# Patient Record
Sex: FEMALE | Race: WHITE | NOT HISPANIC OR LATINO | Employment: OTHER | URBAN - METROPOLITAN AREA
[De-identification: names, ages, dates, MRNs, and addresses within clinical notes are randomized per-mention and may not be internally consistent; named-entity substitution may affect disease eponyms.]

---

## 2023-02-09 PROBLEM — S82.839A FRACTURE OF DISTAL FIBULA: Status: ACTIVE | Noted: 2023-02-09

## 2023-02-09 PROBLEM — S99.919A ANKLE INJURY, INITIAL ENCOUNTER: Status: ACTIVE | Noted: 2023-02-09

## 2023-02-09 PROBLEM — E55.9 VITAMIN D DEFICIENCY: Status: ACTIVE | Noted: 2023-02-09

## 2023-02-09 PROBLEM — M16.0 OSTEOARTHRITIS OF BOTH HIPS: Status: ACTIVE | Noted: 2023-02-09

## 2023-02-09 PROBLEM — H26.9 CATARACT, BILATERAL: Status: ACTIVE | Noted: 2023-02-09

## 2023-02-09 PROBLEM — M85.851 OSTEOPENIA OF RIGHT HIP: Status: ACTIVE | Noted: 2023-02-09

## 2023-02-09 PROBLEM — J30.9 ALLERGIC RHINITIS: Status: ACTIVE | Noted: 2023-02-09

## 2023-02-09 PROBLEM — E78.1 HYPERTRIGLYCERIDEMIA: Status: ACTIVE | Noted: 2023-02-09

## 2023-02-09 PROBLEM — L23.9 ALLERGIC DERMATITIS: Status: ACTIVE | Noted: 2023-02-09

## 2023-02-09 PROBLEM — H81.10 BPPV (BENIGN PAROXYSMAL POSITIONAL VERTIGO): Status: ACTIVE | Noted: 2023-02-09

## 2023-02-09 PROBLEM — R92.8 ABNORMAL MAMMOGRAM: Status: ACTIVE | Noted: 2023-02-09

## 2023-02-09 PROBLEM — H11.001 PTERYGIUM OF RIGHT EYE: Status: ACTIVE | Noted: 2023-02-09

## 2023-02-09 PROBLEM — T78.2XXA IDIOPATHIC ANAPHYLAXIS: Status: ACTIVE | Noted: 2023-02-09

## 2023-02-09 PROBLEM — S82.831A CLOSED FRACTURE OF RIGHT DISTAL FIBULA: Status: ACTIVE | Noted: 2023-02-09

## 2023-02-09 RX ORDER — ACETAMINOPHEN 500 MG
TABLET ORAL
COMMUNITY

## 2023-02-09 RX ORDER — ERGOCALCIFEROL 1.25 MG/1
1 CAPSULE ORAL
COMMUNITY
Start: 2018-11-01 | End: 2023-04-04 | Stop reason: SDUPTHER

## 2023-02-09 RX ORDER — EPINEPHRINE 0.3 MG/.3ML
0.3 INJECTION SUBCUTANEOUS AS NEEDED
COMMUNITY
Start: 2019-02-12 | End: 2023-04-04 | Stop reason: SDUPTHER

## 2023-02-09 RX ORDER — CALCIUM CARBONATE 600 MG
1 TABLET ORAL 3 TIMES DAILY
COMMUNITY
Start: 2021-03-15

## 2023-04-04 ENCOUNTER — OFFICE VISIT (OUTPATIENT)
Dept: PRIMARY CARE | Facility: CLINIC | Age: 73
End: 2023-04-04
Payer: COMMERCIAL

## 2023-04-04 VITALS
BODY MASS INDEX: 31.47 KG/M2 | DIASTOLIC BLOOD PRESSURE: 78 MMHG | RESPIRATION RATE: 16 BRPM | SYSTOLIC BLOOD PRESSURE: 124 MMHG | HEIGHT: 62 IN | TEMPERATURE: 97.4 F | HEART RATE: 89 BPM | WEIGHT: 171 LBS

## 2023-04-04 DIAGNOSIS — K22.0 SPASM OF THROAT MUSCLE: Primary | ICD-10-CM

## 2023-04-04 DIAGNOSIS — T78.2XXD IDIOPATHIC ANAPHYLAXIS, SUBSEQUENT ENCOUNTER: ICD-10-CM

## 2023-04-04 DIAGNOSIS — E66.9 OBESITY (BMI 30.0-34.9): ICD-10-CM

## 2023-04-04 DIAGNOSIS — E55.9 VITAMIN D DEFICIENCY: ICD-10-CM

## 2023-04-04 PROBLEM — J30.9 ALLERGIC RHINITIS: Status: RESOLVED | Noted: 2023-02-09 | Resolved: 2023-04-04

## 2023-04-04 PROBLEM — S82.831A CLOSED FRACTURE OF RIGHT DISTAL FIBULA: Status: RESOLVED | Noted: 2023-02-09 | Resolved: 2023-04-04

## 2023-04-04 PROBLEM — E66.811 OBESITY (BMI 30.0-34.9): Status: ACTIVE | Noted: 2023-04-04

## 2023-04-04 PROBLEM — S99.919A ANKLE INJURY, INITIAL ENCOUNTER: Status: RESOLVED | Noted: 2023-02-09 | Resolved: 2023-04-04

## 2023-04-04 PROBLEM — S82.839A FRACTURE OF DISTAL FIBULA: Status: RESOLVED | Noted: 2023-02-09 | Resolved: 2023-04-04

## 2023-04-04 PROBLEM — H11.001 PTERYGIUM OF RIGHT EYE: Status: RESOLVED | Noted: 2023-02-09 | Resolved: 2023-04-04

## 2023-04-04 PROCEDURE — 99214 OFFICE O/P EST MOD 30 MIN: CPT | Performed by: FAMILY MEDICINE

## 2023-04-04 PROCEDURE — 1036F TOBACCO NON-USER: CPT | Performed by: FAMILY MEDICINE

## 2023-04-04 PROCEDURE — 1160F RVW MEDS BY RX/DR IN RCRD: CPT | Performed by: FAMILY MEDICINE

## 2023-04-04 PROCEDURE — 1159F MED LIST DOCD IN RCRD: CPT | Performed by: FAMILY MEDICINE

## 2023-04-04 RX ORDER — EPINEPHRINE 0.3 MG/.3ML
0.3 INJECTION SUBCUTANEOUS AS NEEDED
Qty: 3 EACH | Refills: 3 | Status: SHIPPED | OUTPATIENT
Start: 2023-04-04

## 2023-04-04 RX ORDER — ERGOCALCIFEROL 1.25 MG/1
1 CAPSULE ORAL
Qty: 12 CAPSULE | Refills: 3 | Status: SHIPPED | OUTPATIENT
Start: 2023-04-04 | End: 2024-06-06

## 2023-04-04 RX ORDER — CALCIUM CARBONATE 200(500)MG
2 TABLET,CHEWABLE ORAL DAILY
COMMUNITY

## 2023-04-04 ASSESSMENT — PATIENT HEALTH QUESTIONNAIRE - PHQ9
SUM OF ALL RESPONSES TO PHQ9 QUESTIONS 1 AND 2: 0
2. FEELING DOWN, DEPRESSED OR HOPELESS: NOT AT ALL
1. LITTLE INTEREST OR PLEASURE IN DOING THINGS: NOT AT ALL

## 2023-04-04 NOTE — PROGRESS NOTES
Jodie Barton is a 72 y.o. female here today for vitamin d deficiency.     Vitamin D deficiency -- Patient reports no muscle weakness or pain.  Taking Vitamin D as instructed.  She takes 50,000 units once weekly.    Muscle spasm sensation in throat.  Gets pain in jaw on left.  Started in October since she had a dental scaling and 2 crowns.  Was dxed by dentist with TMJ.  Still with feelings of throat spasms at times.  Sxs have improved but not resolving.  She would like to see an ENT about this.      She has had trouble losing weight and is interested in my opinion about medications for weight loss.  She has never seen a dietician.          Objective    Visit Vitals  /78   Pulse 89   Temp 36.3 °C (97.4 °F)   Resp 16     Body mass index is 31.28 kg/m².     Physical Exam   General - Not in acute distress and cooperative.  Build & Nutrition - Well developed  Posture - Normal  Gait - Normal  Mental Status - alert and oriented x 3    Head - Normocephalic    Eyes - Bilateral - Sclera clear and lids pink without edema or mass.      Skin - Warm and dry with no rashes on visible skin    Neuropsychiatric - normal mood and affect, well groomed and good eye contact.  Able to articulate well with normal speech/language, rate and coherence.  Associations are intact.  No evidence of hallucinations, delusions, obsessions or homicidal/suicidal ideation.  Attention span and ability to concentrate are normal.        Assessment    1. Spasm of throat muscle  Referral to ENT   I am not certain what is causing the sensation but I will refer her to an ENT for further evaluation at her request.     2. Idiopathic anaphylaxis, subsequent encounter  EPINEPHrine 0.3 mg/0.3 mL injection syringe   I will refill her EpiPen at her request.     3. Vitamin D deficiency  ergocalciferol (Vitamin D-2) 1.25 MG (63803 UT) capsule   Condition well controlled.  No change in current treatment regimen.  Refill given of current medication.  Appropriate  labs ordered or reviewed.  Make a follow up appointment with me for recheck in 6 months.     4. Obesity (BMI 30.0-34.9)  Referral to Nutrition Services   I told her that I think she should see a dietitian to discuss her diet and give her counseling and advice on weight loss.  Her exercise seems excellent by her description.

## 2023-09-19 ENCOUNTER — TELEPHONE (OUTPATIENT)
Dept: PRIMARY CARE | Facility: CLINIC | Age: 73
End: 2023-09-19
Payer: COMMERCIAL

## 2023-09-19 NOTE — TELEPHONE ENCOUNTER
Pt wants to confirm she is up to date with vaccines, she will be getting her flu and rsv tomorrow.

## 2023-10-19 ENCOUNTER — OFFICE VISIT (OUTPATIENT)
Dept: PRIMARY CARE | Facility: CLINIC | Age: 73
End: 2023-10-19
Payer: COMMERCIAL

## 2023-10-19 VITALS
RESPIRATION RATE: 16 BRPM | TEMPERATURE: 97.3 F | HEART RATE: 104 BPM | SYSTOLIC BLOOD PRESSURE: 120 MMHG | BODY MASS INDEX: 29.44 KG/M2 | WEIGHT: 160 LBS | DIASTOLIC BLOOD PRESSURE: 80 MMHG | HEIGHT: 62 IN

## 2023-10-19 DIAGNOSIS — Z11.4 SCREENING FOR HIV WITHOUT PRESENCE OF RISK FACTORS: ICD-10-CM

## 2023-10-19 DIAGNOSIS — E78.1 HYPERTRIGLYCERIDEMIA: ICD-10-CM

## 2023-10-19 DIAGNOSIS — Z78.0 ASYMPTOMATIC MENOPAUSAL STATE: ICD-10-CM

## 2023-10-19 DIAGNOSIS — Z13.6 SCREENING FOR CARDIOVASCULAR CONDITION: ICD-10-CM

## 2023-10-19 DIAGNOSIS — Z00.00 ROUTINE GENERAL MEDICAL EXAMINATION AT HEALTH CARE FACILITY: Primary | ICD-10-CM

## 2023-10-19 DIAGNOSIS — Z00.00 ENCOUNTER FOR ANNUAL WELLNESS EXAM IN MEDICARE PATIENT: ICD-10-CM

## 2023-10-19 DIAGNOSIS — Z11.59 NEED FOR HEPATITIS C SCREENING TEST: ICD-10-CM

## 2023-10-19 PROCEDURE — G0439 PPPS, SUBSEQ VISIT: HCPCS | Performed by: FAMILY MEDICINE

## 2023-10-19 PROCEDURE — 1159F MED LIST DOCD IN RCRD: CPT | Performed by: FAMILY MEDICINE

## 2023-10-19 PROCEDURE — G0444 DEPRESSION SCREEN ANNUAL: HCPCS | Performed by: FAMILY MEDICINE

## 2023-10-19 PROCEDURE — 1036F TOBACCO NON-USER: CPT | Performed by: FAMILY MEDICINE

## 2023-10-19 PROCEDURE — 99397 PER PM REEVAL EST PAT 65+ YR: CPT | Performed by: FAMILY MEDICINE

## 2023-10-19 PROCEDURE — 1160F RVW MEDS BY RX/DR IN RCRD: CPT | Performed by: FAMILY MEDICINE

## 2023-10-19 PROCEDURE — 1170F FXNL STATUS ASSESSED: CPT | Performed by: FAMILY MEDICINE

## 2023-10-19 RX ORDER — TRIAMCINOLONE ACETONIDE 1 MG/G
PASTE DENTAL
COMMUNITY
Start: 2023-09-19 | End: 2023-11-22 | Stop reason: ALTCHOICE

## 2023-10-19 ASSESSMENT — ACTIVITIES OF DAILY LIVING (ADL)
TAKING_MEDICATION: INDEPENDENT
BATHING: INDEPENDENT
GROCERY_SHOPPING: INDEPENDENT
DRESSING: INDEPENDENT
MANAGING_FINANCES: INDEPENDENT
DOING_HOUSEWORK: INDEPENDENT

## 2023-10-19 NOTE — PROGRESS NOTES
History Of Present Illness  Ro Barton is a 73 y.o. male presenting for a Medicare Annual Wellness Exam.  Patient is here for a periodic health exam.  I reviewed previous preventative health measures including screening tests, immunizations and labs.  I reviewed screenings administered by my staff today.    Now on independent pharmacy compounded semaglutide injectable.  Goes to a [facility].  Has lost about 15 lbs. labs done prior to starting this medication showed 6.1 A1C - prediabetes.      Other labs all normal - see scanned report.  No FLP checked.         PREVIOUS PREVENTATIVE HEALTH  Colonoscopy : Yes    Date: 3/29/2019  Cologuard : No  Mammogram : Yes    Date: 1/30/2023  Bone Density : Yes    Date: 1/26/2021  Hepatitis C Antibody : No  Prevnar : Yes    Date: 3/23/2017 Prevnar 13 and 10/31/2019 Pneumovax 23  Shingrix : Yes    Date:  2019 x 2  Tdap within 10 years : Yes  Yearly Flu Shot : Yes    CURRENT FINDINGS  Falls : No  Problems with ADL's :  No  Healthy Diet : Yes  Exercise :  Yes  Vision or Hearing Problems : No  Depression Issues : No  Alcohol Use : No  Tobacco Use : No         Past Medical History  Patient Active Problem List   Diagnosis    Abnormal mammogram    Allergic dermatitis    BPPV (benign paroxysmal positional vertigo)    Cataract, bilateral    Hypertriglyceridemia    Idiopathic anaphylaxis    Osteoarthritis of both hips    Osteopenia of right hip    Vitamin D deficiency    Spasm of throat muscle    Obesity (BMI 30.0-34.9)       Past Surgical History:   Procedure Laterality Date    OTHER SURGICAL HISTORY  10/28/2019    Ankle fracture repair    TUBAL LIGATION  1986    WISDOM TOOTH EXTRACTION  1971        Current Outpatient Medications:     acetaminophen (Tylenol) 500 mg tablet, Take by mouth., Disp: , Rfl:     calcium carbonate (Tums) 200 mg calcium chewable tablet, Chew 2 tablets (1,000 mg) once daily., Disp: , Rfl:     calcium carbonate 600 mg calcium (1,500 mg) tablet, Take 1 tablet  (600 mg) by mouth 3 times a day., Disp: , Rfl:     EPINEPHrine 0.3 mg/0.3 mL injection syringe, Inject 0.3 mL (0.3 mg) as directed if needed for anaphylaxis. As Directed, Disp: 3 each, Rfl: 3    ergocalciferol (Vitamin D-2) 1.25 MG (14010 UT) capsule, Take 1 capsule (1,250 mcg) by mouth 1 (one) time per week., Disp: 12 capsule, Rfl: 3    ibuprofen (MOTRIN ORAL), Take by mouth., Disp: , Rfl:     semaglutide 0.25 mg or 0.5 mg (2 mg/3 mL) pen injector, Inject 0.25 mg under the skin once a week., Disp: , Rfl:     triamcinolone (Kenalog) 0.1 % oral paste, USE AS DIRECTED, Disp: , Rfl:    Immunization History   Administered Date(s) Administered    Influenza, Seasonal, Quadrivalent, Adjuvanted 2023    Influenza, Unspecified 10/04/2022    Moderna COVID-19 vaccine, Fall , 12 yeasrs and older (50mcg/0.5mL) 10/06/2023    Moderna SARS-CoV-2 Vaccination 2021, 2021, 2021, 2022, 10/04/2022    Pneumococcal conjugate vaccine, 13-valent (PREVNAR 13) 2017    Pneumococcal polysaccharide vaccine, 23-valent, age 2 years and older (PNEUMOVAX 23) 10/31/2019    RSV, 60 Years And Older (AREXVY) 2023    Tdap vaccine, age 7 year and older (BOOSTRIX) 2017    Zoster, Unspecified 2019, 2019        Social History  Social History     Socioeconomic History    Marital status:      Spouse name: Not on file    Number of children: Not on file    Years of education: Not on file    Highest education level: Not on file   Occupational History    Not on file   Tobacco Use    Smoking status: Former     Packs/day: 0.50     Years: 10.00     Additional pack years: 0.00     Total pack years: 5.00     Types: Cigarettes     Start date: 6/10/1975     Quit date: 1985     Years since quittin.0    Smokeless tobacco: Never   Substance and Sexual Activity    Alcohol use: Never    Drug use: Never    Sexual activity: Not Currently     Partners: Male     Birth control/protection: Abstinence,  "Condom Male, Rhythm, Female Sterilization, Other     Comment: Birth control pill  from [id number]?   Other Topics Concern    Not on file   Social History Narrative    Not on file     Social Determinants of Health     Financial Resource Strain: Not on file   Food Insecurity: Not on file   Transportation Needs: Not on file   Physical Activity: Not on file   Stress: Not on file   Social Connections: Not on file   Intimate Partner Violence: Not on file   Housing Stability: Not on file        reports no history of alcohol use.    reports that she quit smoking about 38 years ago. Her smoking use included cigarettes. She started smoking about 48 years ago. She has a 5.00 pack-year smoking history. She has never used smokeless tobacco.    reports no history of drug use.           Allergies  Amoxicillin and Cefdinir      Physical Exam   height is 1.575 m (5' 2\") and weight is 72.6 kg (160 lb). Her temperature is 36.3 °C (97.3 °F). Her blood pressure is 120/80 and her pulse is 104. Her respiration is 16.    Physical Exam  Vitals and nursing note reviewed.   Constitutional:       General: She is not in acute distress.     Appearance: Normal appearance.   HENT:      Head: Normocephalic and atraumatic.      Right Ear: Tympanic membrane, ear canal and external ear normal.      Left Ear: Tympanic membrane, ear canal and external ear normal.      Nose: Nose normal.      Mouth/Throat:      Mouth: Mucous membranes are moist.      Pharynx: Oropharynx is clear.   Eyes:      Extraocular Movements: Extraocular movements intact.      Conjunctiva/sclera: Conjunctivae normal.      Pupils: Pupils are equal, round, and reactive to light.   Cardiovascular:      Rate and Rhythm: Normal rate and regular rhythm.      Pulses: Normal pulses.      Heart sounds: Normal heart sounds. No murmur heard.     No friction rub. No gallop.   Pulmonary:      Effort: Pulmonary effort is normal. No respiratory distress.      Breath sounds: Normal breath sounds. "   Chest:   Breasts:     Right: Normal. No mass, nipple discharge or skin change.      Left: Normal. No mass, nipple discharge or skin change.   Abdominal:      General: Abdomen is flat. Bowel sounds are normal. There is no distension.      Palpations: Abdomen is soft.      Tenderness: There is no abdominal tenderness.   Musculoskeletal:         General: Normal range of motion.      Cervical back: Normal range of motion and neck supple.   Lymphadenopathy:      Cervical: No cervical adenopathy.      Upper Body:      Right upper body: No axillary adenopathy.      Left upper body: No axillary adenopathy.   Skin:     General: Skin is warm and dry.      Findings: No lesion or rash.   Neurological:      General: No focal deficit present.      Mental Status: She is alert. Mental status is at baseline.   Psychiatric:         Mood and Affect: Mood normal.         Behavior: Behavior normal.         Thought Content: Thought content normal.         Judgment: Judgment normal.        Assessment      1. Routine general medical examination at health care facility  1 Year Follow Up In [facility] - PCP - Wellness Exam      2. Encounter for annual wellness exam in Medicare patient        3. Hypertriglyceridemia  Lipid Panel   Appropriate labs ordered or reviewed.     4. Screening for cardiovascular condition  CT cardiac scoring wo IV contrast   There is family history of coronary artery disease at an early age and the patient's 10-year ASCVD risk is 10.9%.  I recommend a CT for cardiac calcium scoring.     5. Asymptomatic menopausal state  XR DEXA bone density   We will recheck a bone density.     6. Need for hepatitis C screening test  Hepatitis C antibody      7. Screening for HIV without presence of risk factors  HIV-1 and HIV-2 antibodies           I recommend regular exercise, balanced diet, regular dental exams, and healthy habits.  Patient denies depression sxs.  Patient is able to perform all ADL's without  assistance.  I reminded patient to get yearly eye exams with glaucoma screening.  I recommend to eat plenty of plant foods (such as whole-grain products, fruits, and vegetables) and a moderate amount of lean and low-fat, animal-based food (meat and dairy products).  When shopping, choose lean meats, fish, and poultry. I recommend to try to get regular aerobic exercise.  I recommend a yearly flu shot in the fall and I recommend a yearly wellness exam.      Patient has been losing weight with a outside pharmacy compounded version of semaglutide.  She plans to continue with this program since she has lost about 15 pounds.      Braulio Wood MD

## 2023-11-13 ENCOUNTER — HOSPITAL ENCOUNTER (OUTPATIENT)
Dept: RADIOLOGY | Facility: HOSPITAL | Age: 73
Discharge: HOME | End: 2023-11-13
Payer: COMMERCIAL

## 2023-11-13 ENCOUNTER — LAB (OUTPATIENT)
Dept: LAB | Facility: LAB | Age: 73
End: 2023-11-13
Payer: COMMERCIAL

## 2023-11-13 DIAGNOSIS — Z78.0 ASYMPTOMATIC MENOPAUSAL STATE: ICD-10-CM

## 2023-11-13 DIAGNOSIS — Z11.59 NEED FOR HEPATITIS C SCREENING TEST: ICD-10-CM

## 2023-11-13 DIAGNOSIS — E78.1 HYPERTRIGLYCERIDEMIA: ICD-10-CM

## 2023-11-13 DIAGNOSIS — Z11.4 SCREENING FOR HIV WITHOUT PRESENCE OF RISK FACTORS: ICD-10-CM

## 2023-11-13 LAB
CHOLEST SERPL-MCNC: 162 MG/DL (ref 0–199)
CHOLESTEROL/HDL RATIO: 5
HCV AB SER QL: NONREACTIVE
HDLC SERPL-MCNC: 32.2 MG/DL
HIV 1+2 AB+HIV1 P24 AG SERPL QL IA: NONREACTIVE
LDLC SERPL CALC-MCNC: 109 MG/DL
NON HDL CHOLESTEROL: 130 MG/DL (ref 0–149)
TRIGL SERPL-MCNC: 104 MG/DL (ref 0–149)
VLDL: 21 MG/DL (ref 0–40)

## 2023-11-13 PROCEDURE — 87389 HIV-1 AG W/HIV-1&-2 AB AG IA: CPT

## 2023-11-13 PROCEDURE — 77080 DXA BONE DENSITY AXIAL: CPT

## 2023-11-13 PROCEDURE — 86803 HEPATITIS C AB TEST: CPT

## 2023-11-13 PROCEDURE — 77080 DXA BONE DENSITY AXIAL: CPT | Performed by: STUDENT IN AN ORGANIZED HEALTH CARE EDUCATION/TRAINING PROGRAM

## 2023-11-13 PROCEDURE — 80061 LIPID PANEL: CPT

## 2023-11-13 PROCEDURE — 36415 COLL VENOUS BLD VENIPUNCTURE: CPT

## 2023-11-14 NOTE — RESULT ENCOUNTER NOTE
Please inform the patient that her bone density test showed that there is some thinning of the bones but it is similar to her previous bone density from 2 years ago.  She has osteopenia which means the bones are slightly thinned but not severe enough to be classified as osteoporosis.  If she is not already doing this I recommend she take a calcium supplement 600 mg 2 or 3 times daily and a vitamin D supplement 2000 units once daily.  We will monitor her bone density every 2 to 3 years.

## 2023-11-21 ENCOUNTER — APPOINTMENT (OUTPATIENT)
Dept: RADIOLOGY | Facility: HOSPITAL | Age: 73
End: 2023-11-21
Payer: COMMERCIAL

## 2023-11-22 ENCOUNTER — OFFICE VISIT (OUTPATIENT)
Dept: PRIMARY CARE | Facility: CLINIC | Age: 73
End: 2023-11-22
Payer: COMMERCIAL

## 2023-11-22 VITALS
WEIGHT: 155 LBS | TEMPERATURE: 98.2 F | BODY MASS INDEX: 28.35 KG/M2 | SYSTOLIC BLOOD PRESSURE: 122 MMHG | RESPIRATION RATE: 16 BRPM | DIASTOLIC BLOOD PRESSURE: 80 MMHG | HEART RATE: 68 BPM

## 2023-11-22 DIAGNOSIS — M79.672 ACUTE PAIN OF LEFT FOOT: Primary | ICD-10-CM

## 2023-11-22 PROCEDURE — 1036F TOBACCO NON-USER: CPT | Performed by: NURSE PRACTITIONER

## 2023-11-22 PROCEDURE — 1159F MED LIST DOCD IN RCRD: CPT | Performed by: NURSE PRACTITIONER

## 2023-11-22 PROCEDURE — 1160F RVW MEDS BY RX/DR IN RCRD: CPT | Performed by: NURSE PRACTITIONER

## 2023-11-22 PROCEDURE — 99213 OFFICE O/P EST LOW 20 MIN: CPT | Performed by: NURSE PRACTITIONER

## 2023-11-22 ASSESSMENT — ENCOUNTER SYMPTOMS
VOMITING: 0
FATIGUE: 0
CHILLS: 0
NAUSEA: 0
ACTIVITY CHANGE: 0
FEVER: 0
COUGH: 0
APPETITE CHANGE: 0
MYALGIAS: 1
DIARRHEA: 0
CONSTIPATION: 0
HEADACHES: 0
SLEEP DISTURBANCE: 0

## 2023-11-22 NOTE — PROGRESS NOTES
Subjective   Patient ID: Jodie Barton is a 73 y.o. female who presents for Ankle Pain. PT twisted her ankle walking down the basement stairs - reports low pain (shooting pain) but able to bear weight.    Fell on basement stairs at 10 p last night  Missed a step; holding on to the railing  Limping/ can apply pressure with shoe on  No GI issues      Friday - CT scan    Ankle Pain   The incident occurred 12 to 24 hours ago. The injury mechanism was a twisting injury. The pain is present in the left ankle. The pain has been Intermittent since onset.        Review of Systems   Constitutional:  Negative for activity change, appetite change, chills, fatigue and fever.   HENT:  Negative for congestion.    Respiratory:  Negative for cough.    Gastrointestinal:  Negative for constipation, diarrhea, nausea and vomiting.   Musculoskeletal:  Positive for myalgias.   Neurological:  Negative for headaches.   Psychiatric/Behavioral:  Negative for sleep disturbance.        Objective   /80   Pulse 68   Temp 36.8 °C (98.2 °F)   Resp 16   Wt 70.3 kg (155 lb)   BMI 28.35 kg/m²     Physical Exam  Vitals reviewed.   Constitutional:       Appearance: Normal appearance.   HENT:      Head: Normocephalic.   Eyes:      Extraocular Movements: Extraocular movements intact.      Conjunctiva/sclera: Conjunctivae normal.      Pupils: Pupils are equal, round, and reactive to light.   Cardiovascular:      Rate and Rhythm: Normal rate and regular rhythm.      Pulses: Normal pulses.      Heart sounds: Normal heart sounds.   Pulmonary:      Effort: Pulmonary effort is normal.      Breath sounds: Normal breath sounds.   Musculoskeletal:      Cervical back: Normal range of motion and neck supple.      Left foot: Normal range of motion. No deformity.        Feet:    Feet:      Right foot:      Skin integrity: Skin integrity normal.      Left foot:      Skin integrity: No erythema or warmth.      Comments: Mild swelling over the top, lateral  aspect of L foot  No erythema noted  Skin:     General: Skin is warm and dry.      Capillary Refill: Capillary refill takes less than 2 seconds.   Neurological:      General: No focal deficit present.      Mental Status: She is alert and oriented to person, place, and time.   Psychiatric:         Mood and Affect: Mood normal.         Behavior: Behavior normal.         Assessment/Plan   Problem List Items Addressed This Visit             ICD-10-CM    Acute pain of left foot - Primary M79.672     Order for xray placed; Will follow up on results  Can use ibuprofen as needed for pain  Ice/Heat for 20 minutes several times a day  Elevate and rest as able  OTC ankle sleeve for support  Follow up with PCP if not improving  ER for any worsening pain, numbness/ tingling or worsening of symptoms           Relevant Orders    XR foot left 3+ views

## 2023-11-22 NOTE — ASSESSMENT & PLAN NOTE
Order for xray placed; Will follow up on results  Can use ibuprofen as needed for pain  Ice/Heat for 20 minutes several times a day  Elevate and rest as able  OTC ankle sleeve for support  Follow up with PCP if not improving  ER for any worsening pain, numbness/ tingling or worsening of symptoms

## 2023-11-24 ENCOUNTER — HOSPITAL ENCOUNTER (OUTPATIENT)
Dept: RADIOLOGY | Facility: HOSPITAL | Age: 73
Discharge: HOME | End: 2023-11-24
Payer: COMMERCIAL

## 2023-11-24 DIAGNOSIS — M79.672 ACUTE PAIN OF LEFT FOOT: ICD-10-CM

## 2023-11-24 DIAGNOSIS — Z13.6 SCREENING FOR CARDIOVASCULAR CONDITION: ICD-10-CM

## 2023-11-24 PROCEDURE — 75571 CT HRT W/O DYE W/CA TEST: CPT

## 2023-11-24 PROCEDURE — 73630 X-RAY EXAM OF FOOT: CPT | Mod: LT

## 2023-11-24 PROCEDURE — 73630 X-RAY EXAM OF FOOT: CPT | Mod: LEFT SIDE | Performed by: RADIOLOGY

## 2023-11-27 PROBLEM — I71.21 ANEURYSM OF ASCENDING AORTA WITHOUT RUPTURE (CMS-HCC): Status: ACTIVE | Noted: 2023-11-27

## 2023-11-27 NOTE — RESULT ENCOUNTER NOTE
Please inform the patient that her cardiac CT scan showed a calcium score of 67 which is considered low risk for significant coronary artery disease.  It did note a small aortic aneurysm of the ascending thoracic aorta.  This measured 4.4 cm in size which is considered very slightly larger than normal.  This is nothing that is immediately dangerous but we need to monitor over time to make sure it does not change.  The radiologist recommends a contrast-enhanced CT of the chest in 1 year to monitor this further and make sure there are no changes.  There is nothing that can be done about this currently except to stay active and eat a healthy diet and maintain a proper weight and blood pressure.  We can discuss this further at her next appointment.

## 2023-11-30 ENCOUNTER — TELEPHONE (OUTPATIENT)
Dept: PRIMARY CARE | Facility: CLINIC | Age: 73
End: 2023-11-30
Payer: COMMERCIAL

## 2023-11-30 NOTE — TELEPHONE ENCOUNTER
Pt received call to explain CT results but would like another call back to discuss the results further. Pt saw results on MyChart and wants to discuss aortic aneurysm. Pt said that MyChart shows it is larger than she was originally told and has concerns.

## 2023-12-04 ENCOUNTER — OFFICE VISIT (OUTPATIENT)
Dept: PRIMARY CARE | Facility: CLINIC | Age: 73
End: 2023-12-04
Payer: COMMERCIAL

## 2023-12-04 VITALS
HEIGHT: 62 IN | WEIGHT: 151 LBS | DIASTOLIC BLOOD PRESSURE: 80 MMHG | BODY MASS INDEX: 27.79 KG/M2 | HEART RATE: 66 BPM | RESPIRATION RATE: 16 BRPM | SYSTOLIC BLOOD PRESSURE: 120 MMHG | TEMPERATURE: 97.3 F

## 2023-12-04 DIAGNOSIS — I71.21 ANEURYSM OF ASCENDING AORTA WITHOUT RUPTURE (CMS-HCC): Primary | ICD-10-CM

## 2023-12-04 PROBLEM — Z96.1 PSEUDOPHAKIA OF BOTH EYES: Status: ACTIVE | Noted: 2023-11-09

## 2023-12-04 PROBLEM — R73.03 PREDIABETES: Status: ACTIVE | Noted: 2023-10-19

## 2023-12-04 PROBLEM — R42 VERTIGO: Status: ACTIVE | Noted: 2023-10-19

## 2023-12-04 PROCEDURE — 1160F RVW MEDS BY RX/DR IN RCRD: CPT | Performed by: FAMILY MEDICINE

## 2023-12-04 PROCEDURE — 99213 OFFICE O/P EST LOW 20 MIN: CPT | Performed by: FAMILY MEDICINE

## 2023-12-04 PROCEDURE — 1159F MED LIST DOCD IN RCRD: CPT | Performed by: FAMILY MEDICINE

## 2023-12-04 PROCEDURE — 1036F TOBACCO NON-USER: CPT | Performed by: FAMILY MEDICINE

## 2023-12-04 NOTE — PROGRESS NOTES
"Ro Barton \"Angelica" is a 73 y.o. female here today for   Chief Complaint   Patient presents with    Results     review        HPI     Had CT for cardiac Calcium scoring.  Showed incidental 4.4 cm thoracic aneurysm.  No FH of this.  She denies any chest pain, shortness of breath.  She is here to discuss this further today.  The radiologist recommended a repeat CT scan in 1 year per criteria.  She does not smoke but she used to.  She has no history of hypertension.    Current Outpatient Medications:     acetaminophen (Tylenol) 500 mg tablet, Take by mouth., Disp: , Rfl:     calcium carbonate (Tums) 200 mg calcium chewable tablet, Chew 2 tablets (1,000 mg) once daily., Disp: , Rfl:     calcium carbonate 600 mg calcium (1,500 mg) tablet, Take 1 tablet (1,500 mg) by mouth 3 times a day., Disp: , Rfl:     EPINEPHrine 0.3 mg/0.3 mL injection syringe, Inject 0.3 mL (0.3 mg) as directed if needed for anaphylaxis. As Directed, Disp: 3 each, Rfl: 3    ergocalciferol (Vitamin D-2) 1.25 MG (54592 UT) capsule, Take 1 capsule (1,250 mcg) by mouth 1 (one) time per week., Disp: 12 capsule, Rfl: 3    ibuprofen (MOTRIN ORAL), Take by mouth., Disp: , Rfl:     semaglutide 0.25 mg or 0.5 mg (2 mg/3 mL) pen injector, Inject 0.25 mg under the skin once a week., Disp: , Rfl:     Patient Active Problem List   Diagnosis    Abnormal mammogram    Allergic dermatitis    BPPV (benign paroxysmal positional vertigo)    Cataract, bilateral    Hypertriglyceridemia    Idiopathic anaphylaxis    Osteoarthritis of both hips    Osteopenia of right hip    Vitamin D deficiency    Spasm of throat muscle    Obesity (BMI 30.0-34.9)    Acute pain of left foot    Aneurysm of ascending aorta without rupture (CMS/HCC)    Prediabetes    Pseudophakia of both eyes    Vertigo         Recent Results (from the past 672 hour(s))   Lipid Panel    Collection Time: 11/13/23  9:13 AM   Result Value Ref Range    Cholesterol 162 0 - 199 mg/dL    HDL-Cholesterol 32.2 mg/dL " "   Cholesterol/HDL Ratio 5.0     LDL Calculated 109 (H) <=99 mg/dL    VLDL 21 0 - 40 mg/dL    Triglycerides 104 0 - 149 mg/dL    Non HDL Cholesterol 130 0 - 149 mg/dL   Hepatitis C antibody    Collection Time: 11/13/23  9:13 AM   Result Value Ref Range    Hepatitis C AB Nonreactive Nonreactive   HIV-1 and HIV-2 antibodies    Collection Time: 11/13/23  9:13 AM   Result Value Ref Range    HIV 1/2 Antigen/Antibody Screen with Reflex to Confirmation Nonreactive Nonreactive        Objective    Visit Vitals  /80   Pulse 66   Temp 36.3 °C (97.3 °F)   Resp 16   Ht 1.575 m (5' 2\")   Wt 68.5 kg (151 lb)   BMI 27.62 kg/m²     Body mass index is 27.62 kg/m².     Physical Exam   General - Not in acute distress and cooperative.  Build & Nutrition - Well developed  Posture - Normal  Gait - Normal  Mental Status - alert and oriented x 3    Head - Normocephalic    Neck - Thyroid normal size    Eyes - Bilateral - Sclera clear and lids pink without edema or mass.      Skin - Warm and dry with no rashes on visible skin    Lungs - Clear to auscultation and normal breathing effort    Cardiovascular - RRR and no murmurs, rubs or thrill.    Peripheral Vascular - Bilateral - no edema present    Neuropsychiatric - normal mood and affect        Assessment    1. Aneurysm of ascending aorta without rupture (CMS/HCC)     Much education given on condition, cause, possible treatments and outcomes.  We discussed that the current size aneurysm is extremely low risk for rupture.  The standard protocol is to monitor closely and repeat her CT scan in 1 year.  We discussed that risk for progression include hypertension and smoking.  She is working on her weight and is taking semaglutide currently and has lost about 9 pounds.  If she has any chest pain or shortness of breath she should call us immediately or go to the emergency room if severe.  If we see any progression of the thoracic aneurysm over time I will refer her to a vascular " surgeon.  I answered all of her questions.

## 2024-01-09 ENCOUNTER — TELEPHONE (OUTPATIENT)
Dept: PRIMARY CARE | Facility: CLINIC | Age: 74
End: 2024-01-09
Payer: COMMERCIAL

## 2024-01-09 ENCOUNTER — DOCUMENTATION (OUTPATIENT)
Dept: PRIMARY CARE | Facility: CLINIC | Age: 74
End: 2024-01-09
Payer: COMMERCIAL

## 2024-01-09 DIAGNOSIS — Z12.31 ENCOUNTER FOR SCREENING MAMMOGRAM FOR MALIGNANT NEOPLASM OF BREAST: ICD-10-CM

## 2024-01-09 NOTE — LETTER
January 9, 2024    Jodie Barton  [address] [facility] [address]      Dear Ms. Barton:    Mammogram order was placed. Schedule after 1/30, per last mammogram.      If you have any questions or concerns, please don't hesitate to call.    Sincerely,             Malgorzata Thorpe MA        CC: No Recipients

## 2024-02-01 ENCOUNTER — HOSPITAL ENCOUNTER (OUTPATIENT)
Dept: RADIOLOGY | Facility: HOSPITAL | Age: 74
Discharge: HOME | End: 2024-02-01
Payer: COMMERCIAL

## 2024-02-01 DIAGNOSIS — Z12.31 ENCOUNTER FOR SCREENING MAMMOGRAM FOR MALIGNANT NEOPLASM OF BREAST: ICD-10-CM

## 2024-02-01 PROCEDURE — 77067 SCR MAMMO BI INCL CAD: CPT | Performed by: RADIOLOGY

## 2024-02-01 PROCEDURE — 77067 SCR MAMMO BI INCL CAD: CPT

## 2024-02-01 PROCEDURE — 77063 BREAST TOMOSYNTHESIS BI: CPT | Performed by: RADIOLOGY

## 2024-06-06 DIAGNOSIS — E55.9 VITAMIN D DEFICIENCY: ICD-10-CM

## 2024-06-06 RX ORDER — ERGOCALCIFEROL 1.25 MG/1
1 CAPSULE ORAL
Qty: 12 CAPSULE | Refills: 3 | Status: SHIPPED | OUTPATIENT
Start: 2024-06-09

## 2024-10-21 ENCOUNTER — APPOINTMENT (OUTPATIENT)
Dept: PRIMARY CARE | Facility: CLINIC | Age: 74
End: 2024-10-21
Payer: COMMERCIAL

## 2024-10-23 ENCOUNTER — APPOINTMENT (OUTPATIENT)
Dept: PRIMARY CARE | Facility: CLINIC | Age: 74
End: 2024-10-23
Payer: COMMERCIAL

## 2024-10-23 VITALS
SYSTOLIC BLOOD PRESSURE: 108 MMHG | HEART RATE: 102 BPM | DIASTOLIC BLOOD PRESSURE: 64 MMHG | RESPIRATION RATE: 18 BRPM | TEMPERATURE: 97.6 F | WEIGHT: 144 LBS | HEIGHT: 62 IN | BODY MASS INDEX: 26.5 KG/M2

## 2024-10-23 DIAGNOSIS — Z12.31 ENCOUNTER FOR SCREENING MAMMOGRAM FOR BREAST CANCER: ICD-10-CM

## 2024-10-23 DIAGNOSIS — Z00.00 ROUTINE GENERAL MEDICAL EXAMINATION AT HEALTH CARE FACILITY: Primary | ICD-10-CM

## 2024-10-23 DIAGNOSIS — R73.03 PREDIABETES: ICD-10-CM

## 2024-10-23 PROCEDURE — 1123F ACP DISCUSS/DSCN MKR DOCD: CPT | Performed by: FAMILY MEDICINE

## 2024-10-23 PROCEDURE — 1160F RVW MEDS BY RX/DR IN RCRD: CPT | Performed by: FAMILY MEDICINE

## 2024-10-23 PROCEDURE — G0439 PPPS, SUBSEQ VISIT: HCPCS | Performed by: FAMILY MEDICINE

## 2024-10-23 PROCEDURE — 1158F ADVNC CARE PLAN TLK DOCD: CPT | Performed by: FAMILY MEDICINE

## 2024-10-23 PROCEDURE — 1170F FXNL STATUS ASSESSED: CPT | Performed by: FAMILY MEDICINE

## 2024-10-23 PROCEDURE — 3008F BODY MASS INDEX DOCD: CPT | Performed by: FAMILY MEDICINE

## 2024-10-23 PROCEDURE — 99397 PER PM REEVAL EST PAT 65+ YR: CPT | Performed by: FAMILY MEDICINE

## 2024-10-23 PROCEDURE — 1159F MED LIST DOCD IN RCRD: CPT | Performed by: FAMILY MEDICINE

## 2024-10-23 RX ORDER — CETIRIZINE HYDROCHLORIDE 10 MG/1
TABLET ORAL
COMMUNITY
Start: 2020-03-23

## 2024-10-23 RX ORDER — ASPIRIN 81 MG/1
81 TABLET ORAL
COMMUNITY
Start: 2024-03-06

## 2024-10-23 RX ORDER — ATORVASTATIN CALCIUM 20 MG/1
20 TABLET, FILM COATED ORAL DAILY
COMMUNITY

## 2024-10-23 ASSESSMENT — ACTIVITIES OF DAILY LIVING (ADL)
DRESSING: INDEPENDENT
DOING_HOUSEWORK: INDEPENDENT
BATHING: INDEPENDENT
MANAGING_FINANCES: INDEPENDENT
TAKING_MEDICATION: INDEPENDENT
GROCERY_SHOPPING: INDEPENDENT

## 2024-10-23 ASSESSMENT — PATIENT HEALTH QUESTIONNAIRE - PHQ9
2. FEELING DOWN, DEPRESSED OR HOPELESS: NOT AT ALL
1. LITTLE INTEREST OR PLEASURE IN DOING THINGS: NOT AT ALL
SUM OF ALL RESPONSES TO PHQ9 QUESTIONS 1 AND 2: 0

## 2024-10-23 NOTE — PROGRESS NOTES
-  History Of Present Illness  Ro Barton is a 74 y.o. female presenting for a Medicare Annual Wellness Exam.  Patient is here for a periodic health exam.  I reviewed previous preventative health measures including screening tests, immunizations and labs.  I reviewed screenings administered by my staff today.       PREVIOUS PREVENTATIVE HEALTH    Colonoscopy : Yes    Date: 3/29/2019  Cologuard : No  Mammogram : Yes 2/6/2024  Bone Density : Yes 1/26/2021  Hepatitis C Antibody : Yes 11/13/2023  Prevnar : Yes 3/23/2017  Shingrix : Yes 2019 x 2  Tdap within 10 years : Yes 3/23/2017  Yearly Flu Shot : Yes      CURRENT FINDINGS    Falls : No  Problems with ADL's :  No  Healthy Diet : Yes  Exercise :  Yes  Vision or Hearing Problems : No  Depression Issues : No  Alcohol Use : No  Tobacco Use : No         Past Medical History  Patient Active Problem List   Diagnosis    Abnormal mammogram    Allergic dermatitis    BPPV (benign paroxysmal positional vertigo)    Cataract, bilateral    Hypertriglyceridemia    Idiopathic anaphylaxis    Osteoarthritis of both hips    Osteopenia of right hip    Vitamin D deficiency    Spasm of throat muscle    Obesity (BMI 30.0-34.9)    Acute pain of left foot    Aneurysm of ascending aorta without rupture (CMS-HCC)    Prediabetes    Pseudophakia of both eyes    Vertigo       Past Surgical History:   Procedure Laterality Date    OTHER SURGICAL HISTORY  10/28/2019    Ankle fracture repair    TUBAL LIGATION  1986    WISDOM TOOTH EXTRACTION  1971        Current Outpatient Medications:     acetaminophen (Tylenol) 500 mg tablet, Take by mouth., Disp: , Rfl:     aspirin 81 mg EC tablet, Take 1 tablet (81 mg) by mouth once daily., Disp: , Rfl:     atorvastatin (Lipitor) 20 mg tablet, Take 1 tablet (20 mg) by mouth once daily., Disp: , Rfl:     calcium carbonate 600 mg calcium (1,500 mg) tablet, Take 1 tablet (1,500 mg) by mouth 3 times a day., Disp: , Rfl:     cetirizine (ZyrTEC) 10 mg tablet, Take  by mouth once daily., Disp: , Rfl:     EPINEPHrine 0.3 mg/0.3 mL injection syringe, Inject 0.3 mL (0.3 mg) as directed if needed for anaphylaxis. As Directed, Disp: 3 each, Rfl: 3    ergocalciferol (Vitamin D-2) 1.25 MG (49965 UT) capsule, TAKE 1 CAPSULE (1,250 MCG) BY MOUTH 1 (ONE) TIME PER WEEK., Disp: 12 capsule, Rfl: 3    ibuprofen (MOTRIN ORAL), Take by mouth., Disp: , Rfl:     semaglutide 0.25 mg or 0.5 mg (2 mg/3 mL) pen injector, Inject 0.25 mg under the skin once a week., Disp: , Rfl:    Immunization History   Administered Date(s) Administered    Flu vaccine, trivalent, preservative free, HIGH-DOSE, age 65y+ (Fluzone) 2024    Influenza, Seasonal, Quadrivalent, Adjuvanted 2023    Influenza, Unspecified 10/04/2022    Moderna COVID-19 vaccine, 12 years and older (50mcg/0.5mL)(Spikevax) 10/06/2023    Moderna SARS-CoV-2 Vaccination 2021, 2021, 2021, 2022, 10/04/2022    Pfizer COVID-19 vaccine, 12 years and older, (30mcg/0.3mL) (Comirnaty) 2024    Pneumococcal conjugate vaccine, 13-valent (PREVNAR 13) 2017    Pneumococcal polysaccharide vaccine, 23-valent, age 2 years and older (PNEUMOVAX 23) 10/31/2019    RSV, 60 Years And Older (AREXVY) 2023    Tdap vaccine, age 7 year and older (BOOSTRIX, ADACEL) 2017    Zoster, Unspecified 2019, 2019        Social History  Social History     Socioeconomic History    Marital status:      Spouse name: Not on file    Number of children: Not on file    Years of education: Not on file    Highest education level: Not on file   Occupational History    Not on file   Tobacco Use    Smoking status: Former     Current packs/day: 0.00     Average packs/day: 0.5 packs/day for 10.3 years (5.1 ttl pk-yrs)     Types: Cigarettes     Start date: 6/10/1975     Quit date: 1985     Years since quittin.1    Smokeless tobacco: Never   Substance and Sexual Activity    Alcohol use: Never    Drug use: Never     Sexual activity: Not Currently     Partners: Male     Birth control/protection: Abstinence, Condom Male, Rhythm, Female Sterilization, Other     Comment: Birth control pill  from [id number]?   Other Topics Concern    Not on file   Social History Narrative    Not on file     Social Drivers of Health     Financial Resource Strain: Low Risk  (7/10/2024)    Received from [facility]    Overall Financial Resource Strain (CARDIA)     Difficulty of Paying Living Expenses: Not hard at all   Food Insecurity: No Food Insecurity (7/10/2024)    Received from [facility]    Hunger Vital Sign     Worried About Running Out of Food in the Last Year: Never true     Ran Out of Food in the Last Year: Never true   Transportation Needs: No Transportation Needs (7/10/2024)    Received from [facility]    PRAPARE - Transportation     Lack of Transportation (Medical): No     Lack of Transportation (Non-Medical): No   Physical Activity: Insufficiently Active (7/10/2024)    Received from [facility]    Exercise Vital Sign     Days of Exercise per Week: 3 days     Minutes of Exercise per Session: 30 min   Stress: No Stress Concern Present (7/10/2024)    Received from [facility]    [employer] - Occupational Stress Questionnaire     Feeling of Stress : Not at all   Social Connections: Socially Integrated (7/10/2024)    Received from [facility]    Social Connection and Isolation Panel [NHANES]     Frequency of Communication with Friends and Family: More than three times a week     Frequency of Social Gatherings with Friends and Family: More than three times a week     Attends Islam Services: 1 to 4 times per year     Active Member of Clubs or Organizations: Yes     Attends Club or Organization Meetings: 1 to 4 times per year     Marital Status: Living with partner   Intimate Partner Violence: Not At Risk (7/10/2024)    Received from [facility]    Humiliation, Afraid, Rape, and Kick questionnaire     Fear of  "Current or Ex-Partner: No     Emotionally Abused: No     Physically Abused: No     Sexually Abused: No   Housing Stability: Low Risk  (7/10/2024)    [facility]    Housing Stability Vital Sign     Unable to Pay for Housing in the Last Year: No     Number of Times Moved in the Last Year: 0     Homeless in the Last Year: No        reports no history of alcohol use.    reports that she quit smoking about 39 years ago. Her smoking use included cigarettes. She started smoking about 49 years ago. She has a 5.1 pack-year smoking history. She has never used smokeless tobacco.    reports no history of drug use.           Allergies  Amoxicillin and Cefdinir      Physical Exam  Visit Vitals  /64   Pulse 102   Temp 36.4 °C (97.6 °F)   Resp 18   Ht 1.575 m (5' 2\")   Wt 65.3 kg (144 lb)   BMI 26.34 kg/m²   OB Status Postmenopausal   Smoking Status Former   BSA 1.69 m²       Physical Exam  Vitals and nursing note reviewed.   Constitutional:       General: She is not in acute distress.     Appearance: Normal appearance.   HENT:      Head: Normocephalic and atraumatic.      Right Ear: Tympanic membrane, ear canal and external ear normal.      Left Ear: Tympanic membrane, ear canal and external ear normal.      Nose: Nose normal.      Mouth/Throat:      Mouth: Mucous membranes are moist.      Pharynx: Oropharynx is clear.   Eyes:      Extraocular Movements: Extraocular movements intact.      Conjunctiva/sclera: Conjunctivae normal.      Pupils: Pupils are equal, round, and reactive to light.   Cardiovascular:      Rate and Rhythm: Normal rate and regular rhythm.      Pulses: Normal pulses.      Heart sounds: Normal heart sounds. No murmur heard.     No friction rub. No gallop.   Pulmonary:      Effort: Pulmonary effort is normal. No respiratory distress.      Breath sounds: Normal breath sounds.   Chest:   Breasts:     Right: Normal. No mass, nipple discharge or skin change.      Left: Normal. No mass, nipple " discharge or skin change.   Abdominal:      General: Abdomen is flat. Bowel sounds are normal. There is no distension.      Palpations: Abdomen is soft.      Tenderness: There is no abdominal tenderness.   Musculoskeletal:         General: Normal range of motion.      Cervical back: Normal range of motion and neck supple.   Lymphadenopathy:      Cervical: No cervical adenopathy.      Upper Body:      Right upper body: No axillary adenopathy.      Left upper body: No axillary adenopathy.   Skin:     General: Skin is warm and dry.      Findings: No lesion or rash.   Neurological:      General: No focal deficit present.      Mental Status: She is alert. Mental status is at baseline.   Psychiatric:         Mood and Affect: Mood normal.         Behavior: Behavior normal.         Thought Content: Thought content normal.         Judgment: Judgment normal.                 Assessment      1. Routine general medical examination at health care facility  1 Year Follow Up In [facility] - PCP - Wellness Exam      2. Prediabetes  Hemoglobin A1C   We will check an A1c and call her with results.     3. Encounter for screening mammogram for breast cancer  BI mammo bilateral screening tomosynthesis           I recommend regular exercise, balanced diet, regular dental exams, and healthy habits.  Patient denies depression sxs.  Patient is able to perform all ADL's without assistance.  I reminded patient to get yearly eye exams with glaucoma screening.  I recommend to eat plenty of plant foods (such as whole-grain products, fruits, and vegetables) and a moderate amount of lean and low-fat, animal-based food (meat and dairy products).  When shopping, choose lean meats, fish, and poultry. I recommend to try to get regular aerobic exercise.  I recommend a yearly flu shot in the fall and I recommend a yearly wellness exam.            Orders Placed This Encounter   Procedures    BI mammo bilateral screening tomosynthesis     Hemoglobin A1C        New Medications Ordered This Visit   Medications    aspirin 81 mg EC tablet     Sig: Take 1 tablet (81 mg) by mouth once daily.    atorvastatin (Lipitor) 20 mg tablet     Sig: Take 1 tablet (20 mg) by mouth once daily.    cetirizine (ZyrTEC) 10 mg tablet     Sig: Take by mouth once daily.                    Braulio Wood MD

## 2024-11-04 ENCOUNTER — LAB (OUTPATIENT)
Dept: LAB | Facility: LAB | Age: 74
End: 2024-11-04
Payer: COMMERCIAL

## 2024-11-04 DIAGNOSIS — R73.03 PREDIABETES: ICD-10-CM

## 2024-11-04 LAB
EST. AVERAGE GLUCOSE BLD GHB EST-MCNC: 114 MG/DL
HBA1C MFR BLD: 5.6 %

## 2024-11-04 PROCEDURE — 83036 HEMOGLOBIN GLYCOSYLATED A1C: CPT

## 2024-11-04 PROCEDURE — 36415 COLL VENOUS BLD VENIPUNCTURE: CPT

## 2024-11-06 NOTE — RESULT ENCOUNTER NOTE
NEPHROLOGY CLINIC PROGRESS NOTE      DATE OF SERVICE:  3/18/24  REFERRING PHYSICIAN:  Sunni Sanders MD      CHIEF COMPLAINT:    Chief Complaint   Patient presents with    Follow-up     4 month         INTERVAL HISTORY:    Patient here for follow-up visit.  She has been feeling well and denies any worsening shortness or breath or chest pain.  She was advised to increase her fluid intake which she has done so.  She has no longer on the losartan-HCTZ and her BP has been under good control.  She was started on Jardiance 10 mg daily last visit and has been tolerating it well.  Denies any excessive polyuria or UTIs.  She has been compliant with her medications.  She has not had any labs done since November 2023 when her creatinine was up at 1.7.  She underwent ultrasound of her kidneys in December 2023 which showed normal-sized kidneys and no obstructive findings are nephrolithiasis.    REVIEW OF SYSTEMS:   A complete 3 point ROS done and is neg except as mentioned above      PROBLEM LIST  Patient Active Problem List   Diagnosis    Essential hypertension, benign    Open-angle glaucoma    Type 2 diabetes mellitus with stage 3 chronic kidney disease, with long-term current use of insulin (CMD)    MUSCLE WEAKNESS (GENERALIZED) PT    Arthrosis of left midfoot    Pain in joint, lower leg    Headache(784.0)    Incidental lung nodule, > 3mm and < 8mm    Anxiety state, unspecified    Diverticula of colon    Intertrigo    Lichen sclerosus et atrophicus of the vulva    Class 3 obesity with alveolar hypoventilation, serious comorbidity, and body mass index (BMI) of 40.0 to 44.9 in adult (CMD)    CKD (chronic kidney disease) stage 3, GFR 30-59 ml/min (CMD)    Urinary incontinence    Lumbar stenosis with neurogenic claudication    Spondylolisthesis at L4-L5 level    Atrophic vaginitis    Multiple falls    Chronic left shoulder pain    Left foot pain    Chronic bilateral low back pain without sciatica    Tubular adenoma     Please inform the patient that her A1c was 5.6 which means that her prediabetes is well-controlled with current treatments. Coronary artery disease    Mild recurrent major depression (CMD)    Urinary retention    Hypoxemia    Dependence on supplemental oxygen    Hyperlipidemia associated with type 2 diabetes mellitus (CMD)    Chronic respiratory failure with hypoxia and hypercapnia (CMD)    Exudative age-related macular degeneration of right eye with active choroidal neovascularization (CMD)    Mild nonproliferative diabetic retinopathy of both eyes without macular edema associated with type 2 diabetes mellitus (CMD)    Extreme obesity with alveolar hypoventilation (CMD)    Restrictive lung disease secondary to obesity    Thoracic aortic ectasia (CMD)    MSSA bacteremia    Chronic heart failure with preserved ejection fraction (CMD)    Epiretinal membrane (ERM) of left eye    Cystoid macular edema of left eye    Macular edema    COVID-19 virus infection          MEDICATIONS:    Current Outpatient Medications   Medication Sig Dispense Refill    mirabegron ER (MYRBETRIQ) 25 MG 24 hour tablet Take 1 tablet by mouth daily. 90 tablet 1    furosemide (LASIX) 20 MG tablet Take 1 tablet by mouth daily as needed (edema). Overdue for follow up, please call [phone] to scheduled. 90 tablet 3    clobetasol (TEMOVATE) 0.05 % ointment Apply topically 2 days a week. To vulva. 30 g 0    nystatin (MYCOSTATIN) 068119 UNIT/GM powder APPLY TOPICALLY THREE TIMES DAILY AS NEEDED FOR INTERTIGO 60 g 1    hydrOXYzine (ATARAX) 10 MG tablet TAKE 1 TABLET BY MOUTH EVERY 8 HOURS AS NEEDED FOR ITCHING 90 tablet 0    Vitamin D, Ergocalciferol, 1.25 mg (50,000 units) capsule Take 1 capsule by mouth 2 days a week. 26 capsule 1    ferrous sulfate 325 (65 FE) MG tablet Take 1 tablet by mouth daily (with breakfast). 90 tablet 3    empagliflozin (Jardiance) 10 MG tablet Take 1 tablet by mouth daily (before breakfast). 90 tablet 3    insulin glargine (Lantus SoloStar) 100 UNIT/ML pen-injector INJECT 40 UNITS SUBCUTANEOUSLY NIGHTLY 15 mL 3    CRANBERRY PO Take 1 tablet by  mouth daily.      Multiple Vitamins-Minerals (PRESERVISION AREDS PO)       carvedilol (COREG) 25 MG tablet Take 1 tablet by mouth in the morning and 1 tablet in the evening. Take with meals. 180 tablet 3    pantoprazole (PROTONIX) 40 MG tablet Take 1 tablet by mouth every 12 hours. 180 tablet 3    atorvastatin (LIPITOR) 80 MG tablet Take 1 tablet by mouth nightly. 90 tablet 3    citalopram (CeleXA) 20 MG tablet Take 1 tablet by mouth daily. 90 tablet 3    timolol (TIMOPTIC) 0.5 % ophthalmic solution Place 1 drop into both eyes in the morning and 1 drop in the evening. 15 mL 6    latanoprost (XALATAN) 0.005 % ophthalmic solution Place 1 drop into both eyes nightly. 7.5 mL 3    Blood Glucose Monitoring Suppl (Accu-Chek Vira Plus) w/Device Kit 1 strip 3 times daily. Test blood sugar 3 times per day. ICD-10: E11.22 1 kit 0    Insulin Pen Needle (Pen Needles 5/16\") 31G X 8 MM Misc nightly. 90 each 3    blood glucose test strip Test blood sugar two times daily. Diagnosis: E11.22. Meter: one touch 180 each 3    Lancet Device Misc Test BS two times daily 180 each 0    oxygen (O2) gas Inhale 3 L/min into the lungs continuous.      acetaminophen (TYLENOL) 325 MG tablet Take 2 tablets by mouth every 4 hours as needed for Pain or Fever.      aspirin 81 MG chewable tablet Chew 1 tablet by mouth daily. 60 tablet 3     No current facility-administered medications for this visit.           OBJECTIVE:      VITAL SIGNS:  Vitals:    03/18/24 1144   BP: 138/56   Pulse: (!) 45         PHYSICAL EXAM:    GEN: No apparent distress, sitting in chair, morbidly obese  HEENT: No conjunctival erythema, neck supple, normocephalic head, mucous membranes dry  CHEST: Symmetrical chest wall expansion, mild crackles at bases  CVS: S1,S2 heard, no rubs  SKIN: Warm to touch, no rashes  EXT: 1+ pedal edema, distal pulses palpable  PSYCH: Cooperative, good affect  NEURO: Alert and oriented x 3, no asterixis      LABORATORY DATA:    Recent Labs   Lab  11/21/23  0951 10/30/23  0509   WBC 8.2 3.6*   HCT 34.2* 32.2*   HGB 10.5* 10.4*    190     Recent Labs   Lab 11/21/23  0951 10/30/23  0509   Sodium 137 139   Potassium 4.4 3.9   Chloride 103 102   Carbon Dioxide 26 30   BUN 40* 31*   Creatinine 1.74* 1.50*   Glucose 268* 97                                  ASSESSMENT  CKD stage IIIB -likely due to hypertensive and diabetic nephropathy - baseline creatinine 1.5-1.7  Longstanding diabetes-for over 40 years   Hypertension-for over 20 years   Severe vitamin-D deficiency  Hyperuricemia  Anemia of chronic disease and iron deficiency  Morbid obesity   Chronic stable HFpEF  Secondary Hyperparathyroidism  Chronic BLE edema    PLAN  -Patient has not had labs done since November 2023 when her creatinine was up at 1.7.  Check BMP today to see where her creatinine is at   -She was started on Jardiance last visit.  Check UPC are today to adjust dose if need be   -Her hemoglobin had been low but stable.  Check iron panel today.  Continue with current dose of ferrous sulfate   -Her uric acid levels have been trending upwards.  Awaiting repeat labs from today   -Her vitamin-D levels had been improving.  Continue with current dose of ergocalciferol   -She does have BLE edema and is on Lasix 20 mg daily p.r.n..  Okay to continue with same   -If edema worsens further, can consider increasing Lasix to 40 mg daily   -PTH levels have been stable.  Recheck at next visit     RTC in 4 months      Thank you for providing me with the opportunity to care for this patient. Please feel free to page me at [phone] if you have any questions

## 2024-11-20 ENCOUNTER — OFFICE VISIT (OUTPATIENT)
Dept: PRIMARY CARE | Facility: CLINIC | Age: 74
End: 2024-11-20
Payer: COMMERCIAL

## 2024-11-20 VITALS
HEART RATE: 70 BPM | BODY MASS INDEX: 26.31 KG/M2 | HEIGHT: 62 IN | DIASTOLIC BLOOD PRESSURE: 68 MMHG | SYSTOLIC BLOOD PRESSURE: 122 MMHG | TEMPERATURE: 97.8 F | RESPIRATION RATE: 16 BRPM | WEIGHT: 143 LBS

## 2024-11-20 DIAGNOSIS — R04.0 ANTERIOR EPISTAXIS: Primary | ICD-10-CM

## 2024-11-20 PROBLEM — R51.9 HEADACHE: Status: ACTIVE | Noted: 2024-11-20

## 2024-11-20 PROBLEM — J01.90 ACUTE SINUSITIS: Status: ACTIVE | Noted: 2024-11-20

## 2024-11-20 PROBLEM — R53.83 FATIGUE: Status: ACTIVE | Noted: 2024-11-20

## 2024-11-20 PROCEDURE — 3008F BODY MASS INDEX DOCD: CPT | Performed by: FAMILY MEDICINE

## 2024-11-20 PROCEDURE — 1159F MED LIST DOCD IN RCRD: CPT | Performed by: FAMILY MEDICINE

## 2024-11-20 PROCEDURE — 1036F TOBACCO NON-USER: CPT | Performed by: FAMILY MEDICINE

## 2024-11-20 PROCEDURE — G2211 COMPLEX E/M VISIT ADD ON: HCPCS | Performed by: FAMILY MEDICINE

## 2024-11-20 PROCEDURE — 99214 OFFICE O/P EST MOD 30 MIN: CPT | Performed by: FAMILY MEDICINE

## 2024-11-20 PROCEDURE — 1160F RVW MEDS BY RX/DR IN RCRD: CPT | Performed by: FAMILY MEDICINE

## 2024-11-20 PROCEDURE — 1123F ACP DISCUSS/DSCN MKR DOCD: CPT | Performed by: FAMILY MEDICINE

## 2024-11-20 NOTE — ASSESSMENT & PLAN NOTE
We discussed nosebleeds and the usual causes.  There is no evidence of a dangerous etiology here.  I recommend that she use nasal saline spray 4-5 times daily to keep the mucous membranes moist and promote healing.  She should avoid manipulating her nose and should not pick off the scabs inside of her nose because this will prevent healing.  I recommend to call us and make a follow up appointment if sxs worsen or do not resolve.

## 2024-11-20 NOTE — PROGRESS NOTES
"Ro Barton \"Angelica" is a 74 y.o. female here today for   Chief Complaint   Patient presents with    Epistaxis (Nose Bleed)        Epistaxis (Nose Bleed)   The bleeding has been from the right nare. This is a recurrent problem. The current episode started more than 1 month ago. The problem occurs every several days.      Right nostril bleeding for about 2 weeks.  Occurs every 2-3 days.  Stops after about 10 minutes.  Some clots come out afterward.  There has not been any nose trauma.  She has no history of previous recurrent nosebleeds.  She says she often reaches up with her finger and picks off the scab inside her nose.  She has no history of bleeding disorder or easy bruising.      Current Outpatient Medications:     aspirin 81 mg EC tablet, Take 1 tablet (81 mg) by mouth once daily., Disp: , Rfl:     atorvastatin (Lipitor) 20 mg tablet, Take 1 tablet (20 mg) by mouth once daily., Disp: , Rfl:     calcium carbonate 600 mg calcium (1,500 mg) tablet, Take 1 tablet (1,500 mg) by mouth 3 times a day., Disp: , Rfl:     EPINEPHrine 0.3 mg/0.3 mL injection syringe, Inject 0.3 mL (0.3 mg) as directed if needed for anaphylaxis. As Directed, Disp: 3 each, Rfl: 3    ergocalciferol (Vitamin D-2) 1.25 MG (58005 UT) capsule, TAKE 1 CAPSULE (1,250 MCG) BY MOUTH 1 (ONE) TIME PER WEEK., Disp: 12 capsule, Rfl: 3    semaglutide 0.25 mg or 0.5 mg (2 mg/3 mL) pen injector, Inject 0.25 mg under the skin once a week., Disp: , Rfl:     Patient Active Problem List   Diagnosis    Abnormal mammogram    Allergic dermatitis    BPPV (benign paroxysmal positional vertigo)    Cataract, bilateral    Hypertriglyceridemia    Idiopathic anaphylaxis    Osteoarthritis of both hips    Osteopenia of right hip    Vitamin D deficiency    Spasm of throat muscle    Obesity (BMI 30.0-34.9)    Acute pain of left foot    Aneurysm of ascending aorta without rupture (CMS-HCC)    Prediabetes    Pseudophakia of both eyes    Vertigo    Acute sinusitis    " "Epistaxis    Fatigue    Headache    Anterior epistaxis         Recent Results (from the past 4 weeks)   Hemoglobin A1C    Collection Time: 11/04/24 11:21 AM   Result Value Ref Range    Hemoglobin A1C 5.6 See comment %    Estimated Average Glucose 114 Not Established mg/dL        Objective    Visit Vitals    Visit Vitals  /68   Pulse 70   Temp 36.6 °C (97.8 °F)   Resp 16   Ht 1.575 m (5' 2\")   Wt 64.9 kg (143 lb)   BMI 26.16 kg/m²   OB Status Postmenopausal   Smoking Status Former   BSA 1.69 m²       Body mass index is 26.16 kg/m².     Physical Exam     Nose-examination of the nostrils reveals no masses or anatomical anomalies.  There is some dried blood in the right nostril but I do not see an obvious source of bleeding.    Assessment & Plan  Anterior epistaxis  We discussed nosebleeds and the usual causes.  There is no evidence of a dangerous etiology here.  I recommend that she use nasal saline spray 4-5 times daily to keep the mucous membranes moist and promote healing.  She should avoid manipulating her nose and should not pick off the scabs inside of her nose because this will prevent healing.  I recommend to call us and make a follow up appointment if sxs worsen or do not resolve.                        No orders of the defined types were placed in this encounter.       No orders of the defined types were placed in this encounter.         "

## 2024-12-30 ENCOUNTER — TELEMEDICINE (OUTPATIENT)
Dept: PRIMARY CARE | Facility: CLINIC | Age: 74
End: 2024-12-30
Payer: COMMERCIAL

## 2024-12-30 DIAGNOSIS — U07.1 COVID-19: Primary | ICD-10-CM

## 2024-12-30 PROCEDURE — 1159F MED LIST DOCD IN RCRD: CPT | Performed by: FAMILY MEDICINE

## 2024-12-30 PROCEDURE — 1123F ACP DISCUSS/DSCN MKR DOCD: CPT | Performed by: FAMILY MEDICINE

## 2024-12-30 PROCEDURE — 1160F RVW MEDS BY RX/DR IN RCRD: CPT | Performed by: FAMILY MEDICINE

## 2024-12-30 PROCEDURE — 99214 OFFICE O/P EST MOD 30 MIN: CPT | Performed by: FAMILY MEDICINE

## 2024-12-30 RX ORDER — NIRMATRELVIR AND RITONAVIR 300-100 MG
3 KIT ORAL 2 TIMES DAILY
Qty: 30 TABLET | Refills: 0 | Status: SHIPPED | OUTPATIENT
Start: 2024-12-30 | End: 2025-01-04

## 2024-12-30 NOTE — PROGRESS NOTES
"Ro Barton \"Angelica" is a 74 y.o. female here today for COVID-19.    This visit was completed via telehealth due to the restrictions of the COVID-19 pandemic. All issues as below were discussed and addressed but no physical exam was performed. If it was felt the patient should be evaluated in clinic then they were directed there. The patient verbally consented to this visit.        HPI     Positive Covid test yesterday.  She has ST, sinus congestion, body aches, cough.  She says her symptoms are not severe.  Felt hot but not sure if fever.  Taking OTC meds prn.  She has had all vaccines.    with lung cancer.      Current Outpatient Medications:     aspirin 81 mg EC tablet, Take 1 tablet (81 mg) by mouth once daily., Disp: , Rfl:     atorvastatin (Lipitor) 20 mg tablet, Take 1 tablet (20 mg) by mouth once daily., Disp: , Rfl:     calcium carbonate 600 mg calcium (1,500 mg) tablet, Take 1 tablet (1,500 mg) by mouth 3 times a day., Disp: , Rfl:     EPINEPHrine 0.3 mg/0.3 mL injection syringe, Inject 0.3 mL (0.3 mg) as directed if needed for anaphylaxis. As Directed, Disp: 3 each, Rfl: 3    ergocalciferol (Vitamin D-2) 1.25 MG (42433 UT) capsule, TAKE 1 CAPSULE (1,250 MCG) BY MOUTH 1 (ONE) TIME PER WEEK., Disp: 12 capsule, Rfl: 3    semaglutide 0.25 mg or 0.5 mg (2 mg/3 mL) pen injector, Inject 0.25 mg under the skin once a week., Disp: , Rfl:     Patient Active Problem List   Diagnosis    Abnormal mammogram    Allergic dermatitis    BPPV (benign paroxysmal positional vertigo)    Cataract, bilateral    Hypertriglyceridemia    Idiopathic anaphylaxis    Osteoarthritis of both hips    Osteopenia of right hip    Vitamin D deficiency    Spasm of throat muscle    Obesity (BMI 30.0-34.9)    Acute pain of left foot    Aneurysm of ascending aorta without rupture (CMS-HCC)    Prediabetes    Pseudophakia of both eyes    Vertigo    Acute sinusitis    Epistaxis    Fatigue    Headache    Anterior epistaxis     "     Objective    Visit Vitals    Visit Vitals  OB Status Postmenopausal   Smoking Status Former       There is no height or weight on file to calculate BMI.     Physical Exam         Assessment & Plan  COVID-19  We discussed COVID-19 infections and the latest CDC recommendations for quarantine, isolation and masking.  We discussed the infectiousness of COVID-19 and its most common symptoms.  I recommend extra rest, plenty of fluids and over-the-counter symptom medications as needed.  COVID-19 can be extremely variable and if there are greatly worsening symptoms especially with high fever, severe cough or shortness of breath they should call us right away or go to the emergency room.      We discussed Paxlovid and she would like a prescription for this.  She will hold atorvastatin x 2 weeks.  A prescription was sent to her pharmacy.  Orders:    nirmatrelvir-ritonavir (Paxlovid) 300 mg (150 mg x 2)-100 mg tablet therapy pack; Take 3 tablets by mouth 2 times a day for 5 days.               No orders of the defined types were placed in this encounter.       No orders of the defined types were placed in this encounter.

## 2025-02-03 ENCOUNTER — APPOINTMENT (OUTPATIENT)
Dept: RADIOLOGY | Facility: HOSPITAL | Age: 75
End: 2025-02-03
Payer: COMMERCIAL

## 2025-02-05 ENCOUNTER — HOSPITAL ENCOUNTER (OUTPATIENT)
Dept: RADIOLOGY | Facility: HOSPITAL | Age: 75
Discharge: HOME | End: 2025-02-05
Payer: MEDICARE

## 2025-02-05 VITALS — HEIGHT: 62 IN | BODY MASS INDEX: 25.58 KG/M2 | WEIGHT: 139 LBS

## 2025-02-05 DIAGNOSIS — Z12.31 ENCOUNTER FOR SCREENING MAMMOGRAM FOR BREAST CANCER: ICD-10-CM

## 2025-02-05 PROCEDURE — 77063 BREAST TOMOSYNTHESIS BI: CPT | Performed by: STUDENT IN AN ORGANIZED HEALTH CARE EDUCATION/TRAINING PROGRAM

## 2025-02-05 PROCEDURE — 77067 SCR MAMMO BI INCL CAD: CPT | Performed by: STUDENT IN AN ORGANIZED HEALTH CARE EDUCATION/TRAINING PROGRAM

## 2025-02-05 PROCEDURE — 77067 SCR MAMMO BI INCL CAD: CPT

## 2025-02-26 ENCOUNTER — OFFICE VISIT (OUTPATIENT)
Dept: PRIMARY CARE | Facility: CLINIC | Age: 75
End: 2025-02-26
Payer: MEDICARE

## 2025-02-26 VITALS
SYSTOLIC BLOOD PRESSURE: 112 MMHG | HEART RATE: 98 BPM | DIASTOLIC BLOOD PRESSURE: 74 MMHG | WEIGHT: 143 LBS | RESPIRATION RATE: 16 BRPM | BODY MASS INDEX: 26.31 KG/M2 | TEMPERATURE: 97 F | HEIGHT: 62 IN

## 2025-02-26 DIAGNOSIS — N30.00 ACUTE CYSTITIS WITHOUT HEMATURIA: ICD-10-CM

## 2025-02-26 DIAGNOSIS — R55 VASOVAGAL SYNCOPE: Primary | ICD-10-CM

## 2025-02-26 LAB
POC APPEARANCE, URINE: CLEAR
POC BILIRUBIN, URINE: NEGATIVE
POC BLOOD, URINE: ABNORMAL
POC COLOR, URINE: YELLOW
POC GLUCOSE, URINE: NEGATIVE MG/DL
POC KETONES, URINE: NEGATIVE MG/DL
POC LEUKOCYTES, URINE: ABNORMAL
POC NITRITE,URINE: NEGATIVE
POC PH, URINE: 5.5 PH
POC PROTEIN, URINE: NEGATIVE MG/DL
POC SPECIFIC GRAVITY, URINE: 1.02
POC UROBILINOGEN, URINE: 0.2 EU/DL

## 2025-02-26 PROCEDURE — 1160F RVW MEDS BY RX/DR IN RCRD: CPT | Performed by: FAMILY MEDICINE

## 2025-02-26 PROCEDURE — 1123F ACP DISCUSS/DSCN MKR DOCD: CPT | Performed by: FAMILY MEDICINE

## 2025-02-26 PROCEDURE — 99214 OFFICE O/P EST MOD 30 MIN: CPT | Performed by: FAMILY MEDICINE

## 2025-02-26 PROCEDURE — 1159F MED LIST DOCD IN RCRD: CPT | Performed by: FAMILY MEDICINE

## 2025-02-26 PROCEDURE — 81002 URINALYSIS NONAUTO W/O SCOPE: CPT | Performed by: FAMILY MEDICINE

## 2025-02-26 PROCEDURE — 3008F BODY MASS INDEX DOCD: CPT | Performed by: FAMILY MEDICINE

## 2025-02-26 PROCEDURE — G2211 COMPLEX E/M VISIT ADD ON: HCPCS | Performed by: FAMILY MEDICINE

## 2025-02-26 RX ORDER — NITROFURANTOIN 25; 75 MG/1; MG/1
100 CAPSULE ORAL 2 TIMES DAILY
Qty: 14 CAPSULE | Refills: 0 | Status: SHIPPED | OUTPATIENT
Start: 2025-02-26 | End: 2025-03-05

## 2025-02-26 NOTE — PROGRESS NOTES
"Ro Barton \"Angelica" is a 74 y.o. female here today for   Chief Complaint   Patient presents with    Hospital Follow-up        HPI     Was at the [facility] 4 days ago for a syncopal episode.  She stood up from taking a warm bath and felt lightheaded and dizzy.  She sat on the toilet and then lost consciousness.  Her  found her and says she was out for a few minutes.  She slumped back against the wall and there was no fall or injury.  She vomited and had diarrhea once after she woke up.  Taken by ambulance.  She was worked up in the emergency room and there were no acute or dangerous findings.  It was felt to be likely vasovagal worsened by some dehydration.  She was given IV fluid and zofran and felt completely normal so she was sent home and told to follow-up with me.  I did review the records from the ER and the results.  No recurrence of sxs since then.  Drinking more fluids now.         UA showed possible UTI but she declined medications.  No dysuria or frequncy.  No culture was sent.  U/A today with WBC and RBC's too today in office.      On semaglutide for weight loss and titrating down her dose.           Current Outpatient Medications:     aspirin 81 mg EC tablet, Take 1 tablet (81 mg) by mouth once daily., Disp: , Rfl:     atorvastatin (Lipitor) 20 mg tablet, Take 1 tablet (20 mg) by mouth once daily., Disp: , Rfl:     calcium carbonate 600 mg calcium (1,500 mg) tablet, Take 1 tablet (1,500 mg) by mouth 3 times a day., Disp: , Rfl:     EPINEPHrine 0.3 mg/0.3 mL injection syringe, Inject 0.3 mL (0.3 mg) as directed if needed for anaphylaxis. As Directed, Disp: 3 each, Rfl: 3    ergocalciferol (Vitamin D-2) 1.25 MG (77950 UT) capsule, TAKE 1 CAPSULE (1,250 MCG) BY MOUTH 1 (ONE) TIME PER WEEK., Disp: 12 capsule, Rfl: 3    semaglutide 0.25 mg or 0.5 mg (2 mg/3 mL) pen injector, Inject 0.25 mg under the skin once a week., Disp: , Rfl:     nitrofurantoin, macrocrystal-monohydrate, (Macrobid) 100 mg " "capsule, Take 1 capsule (100 mg) by mouth 2 times a day for 7 days., Disp: 14 capsule, Rfl: 0    Patient Active Problem List   Diagnosis    Abnormal mammogram    Allergic dermatitis    BPPV (benign paroxysmal positional vertigo)    Cataract, bilateral    Hypertriglyceridemia    Idiopathic anaphylaxis    Osteoarthritis of both hips    Osteopenia of right hip    Vitamin D deficiency    Spasm of throat muscle    Obesity (BMI 30.0-34.9)    Acute pain of left foot    Aneurysm of ascending aorta without rupture (CMS-HCC)    Prediabetes    Pseudophakia of both eyes    Vertigo    Acute sinusitis    Epistaxis    Fatigue    Headache    Anterior epistaxis         Objective    Visit Vitals    Visit Vitals  /74   Pulse 98   Temp 36.1 °C (97 °F)   Resp 16   Ht 1.575 m (5' 2\")   Wt 64.9 kg (143 lb)   BMI 26.16 kg/m²   OB Status Postmenopausal   Smoking Status Former   BSA 1.69 m²       Body mass index is 26.16 kg/m².     Physical Exam     General - Not in acute distress and cooperative.  Build & Nutrition - Well developed  Posture - Normal  Gait - Normal  Mental Status - alert and oriented x 3    Head - Normocephalic    Neck - Thyroid normal size    Eyes - Bilateral - Sclera clear and lids pink without edema or mass.      Skin - Warm and dry with no rashes on visible skin    Lungs - Clear to auscultation and normal breathing effort    Cardiovascular - RRR and no murmurs, rubs or thrill.    Peripheral Vascular - Bilateral - no edema present    Neuropsychiatric - normal mood and affect        Assessment & Plan  Vasovagal syncope  Her history and findings are consistent with vasovagal syncope as diagnosed from the emergency room.  We discussed the need to drink about 8 bottles of water per day to maintain good hydration and lower her risk for recurrence.  I do not think further workup is warranted at this time unless this should recur.       Acute cystitis without hematuria  Her urinalysis today suggests a possible UTI which " could have also contributed to her recent syncope.  We will treat with Macrobid x 7 days and I will send the urine for culture.  Increase fluids.  Avoid caffeine.  Take meds as prescibed.  Call or RTO if fever or new/worsening back pain or if sxs do not fully clear.  Orders:    POCT UA (nonautomated) manually resulted    Urine Culture; Future    nitrofurantoin, macrocrystal-monohydrate, (Macrobid) 100 mg capsule; Take 1 capsule (100 mg) by mouth 2 times a day for 7 days.         Orders Placed This Encounter      nitrofurantoin, macrocrystal-monohydrate, (Macrobid) 100 mg capsule       Orders Placed This Encounter   Procedures    Urine Culture    POCT UA (nonautomated) manually resulted        New Medications Ordered This Visit   Medications    nitrofurantoin, macrocrystal-monohydrate, (Macrobid) 100 mg capsule     Sig: Take 1 capsule (100 mg) by mouth 2 times a day for 7 days.     Dispense:  14 capsule     Refill:  0

## 2025-02-28 LAB — BACTERIA UR CULT: NORMAL

## 2025-03-30 ENCOUNTER — HOSPITAL ENCOUNTER (EMERGENCY)
Facility: HOSPITAL | Age: 75
Discharge: HOME | End: 2025-03-30
Attending: STUDENT IN AN ORGANIZED HEALTH CARE EDUCATION/TRAINING PROGRAM
Payer: MEDICARE

## 2025-03-30 ENCOUNTER — OFFICE VISIT (OUTPATIENT)
Dept: URGENT CARE | Age: 75
End: 2025-03-30
Payer: MEDICARE

## 2025-03-30 VITALS
BODY MASS INDEX: 24.8 KG/M2 | RESPIRATION RATE: 20 BRPM | SYSTOLIC BLOOD PRESSURE: 102 MMHG | TEMPERATURE: 97.7 F | HEART RATE: 94 BPM | WEIGHT: 140 LBS | DIASTOLIC BLOOD PRESSURE: 69 MMHG | HEIGHT: 63 IN | OXYGEN SATURATION: 97 %

## 2025-03-30 VITALS
SYSTOLIC BLOOD PRESSURE: 126 MMHG | TEMPERATURE: 97 F | DIASTOLIC BLOOD PRESSURE: 66 MMHG | HEIGHT: 63 IN | HEART RATE: 86 BPM | OXYGEN SATURATION: 94 % | WEIGHT: 140 LBS | BODY MASS INDEX: 24.8 KG/M2 | RESPIRATION RATE: 20 BRPM

## 2025-03-30 DIAGNOSIS — R04.0 EPISTAXIS: Primary | ICD-10-CM

## 2025-03-30 DIAGNOSIS — R04.0 RIGHT-SIDED EPISTAXIS: Primary | ICD-10-CM

## 2025-03-30 LAB
ERYTHROCYTE [DISTWIDTH] IN BLOOD BY AUTOMATED COUNT: 13.2 % (ref 11.5–14.5)
HCT VFR BLD AUTO: 42.6 % (ref 36–46)
HGB BLD-MCNC: 13.9 G/DL (ref 12–16)
INR PPP: 1.1 (ref 0.9–1.1)
MCH RBC QN AUTO: 30.8 PG (ref 26–34)
MCHC RBC AUTO-ENTMCNC: 32.6 G/DL (ref 32–36)
MCV RBC AUTO: 95 FL (ref 80–100)
NRBC BLD-RTO: 0 /100 WBCS (ref 0–0)
PLATELET # BLD AUTO: 201 X10*3/UL (ref 150–450)
PROTHROMBIN TIME: 12.7 SECONDS (ref 9.8–12.4)
RBC # BLD AUTO: 4.51 X10*6/UL (ref 4–5.2)
WBC # BLD AUTO: 9.1 X10*3/UL (ref 4.4–11.3)

## 2025-03-30 PROCEDURE — 99283 EMERGENCY DEPT VISIT LOW MDM: CPT | Mod: 25 | Performed by: STUDENT IN AN ORGANIZED HEALTH CARE EDUCATION/TRAINING PROGRAM

## 2025-03-30 PROCEDURE — 30901 CONTROL OF NOSEBLEED: CPT | Performed by: FAMILY MEDICINE

## 2025-03-30 PROCEDURE — 2500000005 HC RX 250 GENERAL PHARMACY W/O HCPCS

## 2025-03-30 PROCEDURE — 36415 COLL VENOUS BLD VENIPUNCTURE: CPT

## 2025-03-30 PROCEDURE — 3008F BODY MASS INDEX DOCD: CPT | Performed by: FAMILY MEDICINE

## 2025-03-30 PROCEDURE — 1036F TOBACCO NON-USER: CPT | Performed by: FAMILY MEDICINE

## 2025-03-30 PROCEDURE — 2500000004 HC RX 250 GENERAL PHARMACY W/ HCPCS (ALT 636 FOR OP/ED)

## 2025-03-30 PROCEDURE — 2500000001 HC RX 250 WO HCPCS SELF ADMINISTERED DRUGS (ALT 637 FOR MEDICARE OP): Performed by: STUDENT IN AN ORGANIZED HEALTH CARE EDUCATION/TRAINING PROGRAM

## 2025-03-30 PROCEDURE — 2500000001 HC RX 250 WO HCPCS SELF ADMINISTERED DRUGS (ALT 637 FOR MEDICARE OP)

## 2025-03-30 PROCEDURE — 85027 COMPLETE CBC AUTOMATED: CPT

## 2025-03-30 PROCEDURE — 99204 OFFICE O/P NEW MOD 45 MIN: CPT | Performed by: FAMILY MEDICINE

## 2025-03-30 PROCEDURE — 1159F MED LIST DOCD IN RCRD: CPT | Performed by: FAMILY MEDICINE

## 2025-03-30 PROCEDURE — 1123F ACP DISCUSS/DSCN MKR DOCD: CPT | Performed by: FAMILY MEDICINE

## 2025-03-30 PROCEDURE — 85610 PROTHROMBIN TIME: CPT

## 2025-03-30 PROCEDURE — 30901 CONTROL OF NOSEBLEED: CPT | Mod: 50 | Performed by: STUDENT IN AN ORGANIZED HEALTH CARE EDUCATION/TRAINING PROGRAM

## 2025-03-30 RX ORDER — OXYMETAZOLINE HCL 0.05 %
2 SPRAY, NON-AEROSOL (ML) NASAL ONCE
Status: COMPLETED | OUTPATIENT
Start: 2025-03-30 | End: 2025-03-30

## 2025-03-30 RX ORDER — ACETAMINOPHEN 325 MG/1
975 TABLET ORAL ONCE
Status: COMPLETED | OUTPATIENT
Start: 2025-03-30 | End: 2025-03-30

## 2025-03-30 RX ORDER — TRANEXAMIC ACID 100 MG/ML
500 INJECTION, SOLUTION INTRAVENOUS ONCE
Status: COMPLETED | OUTPATIENT
Start: 2025-03-30 | End: 2025-03-30

## 2025-03-30 RX ORDER — SILVER NITRATE 38.21; 12.74 MG/1; MG/1
1 STICK TOPICAL ONCE
Status: COMPLETED | OUTPATIENT
Start: 2025-03-30 | End: 2025-03-30

## 2025-03-30 RX ORDER — LIDOCAINE HYDROCHLORIDE AND EPINEPHRINE 10; 10 UG/ML; MG/ML
20 INJECTION, SOLUTION INFILTRATION; PERINEURAL ONCE
Status: COMPLETED | OUTPATIENT
Start: 2025-03-30 | End: 2025-03-30

## 2025-03-30 RX ADMIN — SILVER NITRATE APPLICATORS 1 APPLICATION: 25; 75 STICK TOPICAL at 21:16

## 2025-03-30 RX ADMIN — TRANEXAMIC ACID 500 MG: 100 INJECTION, SOLUTION INTRAVENOUS at 18:46

## 2025-03-30 RX ADMIN — OXYMETAZOLINE HYDROCHLORIDE 2 SPRAY: 0.5 SPRAY NASAL at 17:50

## 2025-03-30 RX ADMIN — ACETAMINOPHEN 975 MG: 325 TABLET ORAL at 19:32

## 2025-03-30 RX ADMIN — SILVER NITRATE APPLICATORS 1 APPLICATION: 25; 75 STICK TOPICAL at 20:36

## 2025-03-30 RX ADMIN — LIDOCAINE HYDROCHLORIDE,EPINEPHRINE BITARTRATE 20 ML: 10; .01 INJECTION, SOLUTION INFILTRATION; PERINEURAL at 18:46

## 2025-03-30 ASSESSMENT — COLUMBIA-SUICIDE SEVERITY RATING SCALE - C-SSRS
6. HAVE YOU EVER DONE ANYTHING, STARTED TO DO ANYTHING, OR PREPARED TO DO ANYTHING TO END YOUR LIFE?: NO
1. IN THE PAST MONTH, HAVE YOU WISHED YOU WERE DEAD OR WISHED YOU COULD GO TO SLEEP AND NOT WAKE UP?: NO
2. HAVE YOU ACTUALLY HAD ANY THOUGHTS OF KILLING YOURSELF?: NO

## 2025-03-30 ASSESSMENT — LIFESTYLE VARIABLES
HAVE YOU EVER FELT YOU SHOULD CUT DOWN ON YOUR DRINKING: NO
HAVE PEOPLE ANNOYED YOU BY CRITICIZING YOUR DRINKING: NO
EVER HAD A DRINK FIRST THING IN THE MORNING TO STEADY YOUR NERVES TO GET RID OF A HANGOVER: NO
TOTAL SCORE: 0
EVER FELT BAD OR GUILTY ABOUT YOUR DRINKING: NO

## 2025-03-30 ASSESSMENT — PAIN SCALES - GENERAL: PAINLEVEL_OUTOF10: 0 - NO PAIN

## 2025-03-30 ASSESSMENT — PAIN - FUNCTIONAL ASSESSMENT: PAIN_FUNCTIONAL_ASSESSMENT: 0-10

## 2025-03-30 NOTE — PROGRESS NOTES
"Subjective   Patient ID: Ro Barton \"Angelica" is a 74 y.o. female who is here with her . She presents today with a chief complaint of Other (Nose bleed since 1300 hrs today). Patient states she has had a scab in her right nostril for 1 year that has caused recurrent nosebleeds. She picked at the scab, bent over to pick something up, and developed a nosebleed today approximately 1 hour prior to evaluation. She has a paper towel shoved up her right nostril. She denies history of anemia. She takes a daily aspirin. She denies headaches, facial pain and visual changes.    History of Present Illness  HPI    Past Medical History  Allergies as of 03/30/2025 - Reviewed 03/30/2025   Allergen Reaction Noted    Amoxicillin Unknown 02/09/2023    Cefdinir Unknown 03/20/2020       (Not in a hospital admission)       Past Medical History:   Diagnosis Date    Allergic 12/2015    Allergic rhinitis 02/09/2023    Ankle injury, initial encounter 02/09/2023    Cataract 9/2022    Closed fracture of right distal fibula 02/09/2023    Fracture of distal fibula 02/09/2023    GERD (gastroesophageal reflux disease) 2020    Headache 9/2022    Pterygium of right eye 02/09/2023    Visual impairment 9/2022       Past Surgical History:   Procedure Laterality Date    OTHER SURGICAL HISTORY  10/28/2019    Ankle fracture repair    TUBAL LIGATION  1986    WISDOM TOOTH EXTRACTION  1971        reports that she quit smoking about 39 years ago. Her smoking use included cigarettes. She started smoking about 49 years ago. She has a 5.1 pack-year smoking history. She has never used smokeless tobacco. She reports that she does not drink alcohol and does not use drugs.    Review of Systems  Review of Systems                               Objective    Vitals:    03/30/25 1422   BP: 102/69   BP Location: Left arm   Patient Position: Sitting   Pulse: 94   Resp: 20   Temp: 36.5 °C (97.7 °F)   SpO2: 97%   Weight: 63.5 kg (140 lb)   Height: 1.588 m (5' " "2.5\")     No LMP recorded. Patient is postmenopausal.    Physical Exam    Epistaxis Management    Date/Time: 3/30/2025 4:25 PM    Performed by: Kisha Gotti MD  Authorized by: Kisha Gotti MD    Consent:     Consent obtained:  Verbal    Consent given by:  Patient    Risks, benefits, and alternatives were discussed: yes      Risks discussed:  Bleeding, infection, nasal injury and pain    Alternatives discussed:  No treatment, delayed treatment, alternative treatment, observation and referral  Universal protocol:     Procedure explained and questions answered to patient or proxy's satisfaction: yes      Site/side marked: yes      Immediately prior to procedure, a time out was called: yes      Patient identity confirmed:  Verbally with patient  Anesthesia:     Anesthesia method:  None  Procedure details:     Treatment site:  R anterior    Treatment method:  Nasal tampon    Treatment complexity:  Limited    Treatment episode: initial    Post-procedure details:     Assessment:  Bleeding stopped    Procedure completion:  Tolerated well, no immediate complications      Point of Care Test & Imaging Results from this visit  No results found for this visit on 03/30/25.   Imaging  No results found.    Cardiology, Vascular, and Other Imaging  No other imaging results found for the past 2 days      Diagnostic study results (if any) were reviewed by Kisha Gotti MD.    Assessment/Plan   Allergies, medications, history, and pertinent labs/EKGs/Imaging reviewed by Kisha Gotti MD.     Medical Decision Making      Orders and Diagnoses  Diagnoses and all orders for this visit:  Right-sided epistaxis  -     Referral to ENT; Future      Medical Admin Record      Patient disposition: Home    Electronically signed by Kisha Gotti MD  2:48 PM      "

## 2025-03-30 NOTE — ED PROVIDER NOTES
History of Present Illness     History provided by: Patient  Limitations to History: None  External Records Reviewed with Brief Summary:  see below    HPI:  Ro Barton is a 74 y.o. female who presents with epistaxis from her right nose.  PMHx significant for BPPV, GERD, HLD.  Patient reports that her nosebleed started around 1300 today.  She reports that she coughed up blood clots, which concerned her.  After continual bleeding for an hour, she did go to urgent care.  There, she was treated with a nasal tampon in her right anterior naris.  She was discharged with instructions to follow-up with ENT tomorrow if possible.  However, she reports that soon after she left the urgent care, she noticed worsening bleeding from her right nose again.  Thus, she came to the emergency room for further evaluation.  She reports that she did have a nosebleed about 1 month ago, but this resolved on its own without any medical attention.  She did follow-up with her PCP who per patient report did not recommend any further intervention at that time.  She does mention that she has a scab in her right nose which has been there for about a year.  She has been told to try to avoid irritating it too much and to use a humidifier and saline washes.  She has not been doing these modalities for the past couple days.  She feels that she may have disrupted the scab today which might of contributed to the bleeding.  She has not seen ENT.  She does endorse some dizziness and lightheadedness, but otherwise is feeling well.  She denies any fevers or chills, headache, blurry vision, chest pain, shortness of breath, abdominal pain, nausea, vomiting, change in bowels, urinary symptoms, lower extremity edema.  Of note, the patient did have a syncopal episode about 3 weeks ago which prompted a visit to the emergency room.  Ultimately her workup was negative, and it was attributed to dehydration.  The patient does have an aortic aneurysm which is  being monitored with imaging.    Physical Exam   Triage vitals:  T 36.1 °C (97 °F)  HR (!) 120  /62  RR 20  O2 98 % None (Room air)    Physical Exam  Constitutional:       General: She is not in acute distress.     Appearance: Normal appearance. She is normal weight. She is not ill-appearing.   HENT:      Head: Normocephalic and atraumatic.      Nose:      Comments: On initial exam, patient does have packing in her nose, with bloody discharge.     Mouth/Throat:      Mouth: Mucous membranes are moist.      Comments: No active bleeding or clots noted in the throat  Eyes:      Extraocular Movements: Extraocular movements intact.      Pupils: Pupils are equal, round, and reactive to light.   Cardiovascular:      Rate and Rhythm: Normal rate and regular rhythm.      Pulses: Normal pulses.      Heart sounds: No murmur heard.     No gallop.   Pulmonary:      Effort: Pulmonary effort is normal. No respiratory distress.      Breath sounds: Normal breath sounds. No wheezing, rhonchi or rales.   Abdominal:      General: Abdomen is flat.      Palpations: Abdomen is soft.      Tenderness: There is no abdominal tenderness.   Musculoskeletal:         General: Normal range of motion.      Cervical back: Normal range of motion and neck supple.      Right lower leg: No edema.      Left lower leg: No edema.   Skin:     General: Skin is warm and dry.      Capillary Refill: Capillary refill takes less than 2 seconds.   Neurological:      General: No focal deficit present.      Mental Status: She is alert and oriented to person, place, and time.      Motor: No weakness.          Medical Decision Making & ED Course   Medical Decision Makin y.o. female who presents with epistaxis that started this afternoon around 1300.  She did have 1 prior episode of epistaxis about 1 month ago, which resolved on its own without any outside intervention.  She denies any other prior history of nosebleeds.  She does have a scab in her right  nostril that does not seem to be healing, and has been present for about a year.  She has not seen ENT.  On initial presentation, patient was hemodynamically stable without any tachycardia.  She did have a nasal tampon in place, with some active dripping blood on initial presentation.    Nasal tampon removed, bleeding was present.  Patient was treated with a variety of different ways to try to stop the bleeding.  Initially trialed nasal packing with Afrin soaked gauze.  Bleeding persisted once it was removed.  Thus we tried a mixture of TXA and lidocaine with epinephrine soaking the packing, but the bleeding persisted still.  Next, both the nares were packed with Afrin and lidocaine soaked gauze.  We attempted cautery with silver nitrate, but she did continue to have some oozing of blood from her right nostril.  Finally the bleeding stopped after placement of a Rhino Rocket.    Given her epistaxis, we did obtain labs including a CBC and INR, which are within normal limits.    Patient was discharged in stable condition, with resolution of her epistaxis and a Rhino Rocket in place.  The patient was instructed to follow-up with the ENT, and she was given a referral.  She should discuss the nonhealing scab in her right nostril, as well as her epistaxis.  Return precautions given.  ----  Social Determinants of Health which Significantly Impact Care: None identified     EKG Independent Interpretation: EKG not obtained    Independent Result Review and Interpretation: Relevant laboratory and radiographic results were reviewed and independently interpreted by myself.  As necessary, they are commented on in the ED Course.    Chronic conditions affecting the patient's care: As documented above in [facility]    The patient was discussed with the following consultants/services: None    Care Considerations: As documented above in [facility]    ED Course:  Diagnoses as of 03/30/25 2340   Epistaxis     Disposition   As a result of the work-up,  the patient was discharged home.  she was informed of her diagnosis and instructed to come back with any concerns or worsening of condition.  she and was agreeable to the plan as discussed above.  she was given the opportunity to ask questions.  All of the patient's questions were answered.    Procedures   Procedures    Patient seen and discussed with ED attending physician.    Qian Mendoza DO  Internal Medicine PGY-1 Resident  Emergency Medicine     Qian Mendoza DO  Resident  03/30/25 [id number]

## 2025-03-31 ENCOUNTER — HOSPITAL ENCOUNTER (EMERGENCY)
Facility: HOSPITAL | Age: 75
Discharge: HOME | End: 2025-03-31
Attending: EMERGENCY MEDICINE
Payer: MEDICARE

## 2025-03-31 VITALS
DIASTOLIC BLOOD PRESSURE: 73 MMHG | HEIGHT: 62 IN | SYSTOLIC BLOOD PRESSURE: 123 MMHG | RESPIRATION RATE: 16 BRPM | BODY MASS INDEX: 25.76 KG/M2 | OXYGEN SATURATION: 96 % | WEIGHT: 140 LBS | HEART RATE: 95 BPM | TEMPERATURE: 97.2 F

## 2025-03-31 DIAGNOSIS — R04.0 EPISTAXIS: Primary | ICD-10-CM

## 2025-03-31 PROCEDURE — 99284 EMERGENCY DEPT VISIT MOD MDM: CPT | Performed by: EMERGENCY MEDICINE

## 2025-03-31 PROCEDURE — 99281 EMR DPT VST MAYX REQ PHY/QHP: CPT | Performed by: EMERGENCY MEDICINE

## 2025-03-31 ASSESSMENT — LIFESTYLE VARIABLES
HAVE YOU EVER FELT YOU SHOULD CUT DOWN ON YOUR DRINKING: NO
HAVE PEOPLE ANNOYED YOU BY CRITICIZING YOUR DRINKING: NO
EVER FELT BAD OR GUILTY ABOUT YOUR DRINKING: NO
TOTAL SCORE: 0
EVER HAD A DRINK FIRST THING IN THE MORNING TO STEADY YOUR NERVES TO GET RID OF A HANGOVER: NO

## 2025-03-31 ASSESSMENT — PAIN SCALES - GENERAL: PAINLEVEL_OUTOF10: 3

## 2025-03-31 ASSESSMENT — PAIN DESCRIPTION - LOCATION: LOCATION: THROAT

## 2025-03-31 ASSESSMENT — PAIN DESCRIPTION - PAIN TYPE: TYPE: ACUTE PAIN

## 2025-03-31 ASSESSMENT — PAIN - FUNCTIONAL ASSESSMENT: PAIN_FUNCTIONAL_ASSESSMENT: 0-10

## 2025-03-31 NOTE — DISCHARGE INSTRUCTIONS
Call Dr. Silva office in [place], phone numbers listed in the paperwork here, let them know you are seen in the ER, the ER called him and he said you could be seen in the office this week for further management of your nosebleed, return for any new, concerning or worsening symptoms.

## 2025-03-31 NOTE — DISCHARGE INSTRUCTIONS
You were seen in the emergency room due to a nosebleed.  We attempted numerous times to stop the nosebleed, but ultimately were able to achieve it.  We would recommend that you follow-up with ENT, especially since you have this nonhealing scab in your nose.    If your nosebleed returns and you cannot get into see ENT, please return to the emergency room.  If you begin to feel significant lightheadedness or dizziness or other signs of severe blood loss, please return to the emergency room.

## 2025-03-31 NOTE — ED PROVIDER NOTES
EMERGENCY DEPARTMENT ENCOUNTER      Pt Name: Ro Barton  MRN: [id number]  Birthdate 1950  Date of evaluation: 3/31/2025  Provider: Rod Corbin DO    CHIEF COMPLAINT       Chief Complaint   Patient presents with    Epistaxis (Nose Bleed)         HISTORY OF PRESENT ILLNESS    74-year-old female history of epistaxis, BPPV, comes emergency room with a nosebleed, she was just seen here, had multiple attempts to control her nosebleed, ultimately discharged with a Rhino Rocket, bleeding was stopped, patient said he woke up and coughed up a clot this morning.  She says she has a sore throat right now.  No other symptoms.  She says she cannot get into see ENT till July.      History provided by:  Patient      Nursing Notes were reviewed.    PAST MEDICAL HISTORY     Past Medical History:   Diagnosis Date    Allergic 12/2015    Allergic rhinitis 02/09/2023    Ankle injury, initial encounter 02/09/2023    Cataract 9/2022    Closed fracture of right distal fibula 02/09/2023    Fracture of distal fibula 02/09/2023    GERD (gastroesophageal reflux disease) 2020    Headache 9/2022    Pterygium of right eye 02/09/2023    Visual impairment 9/2022         SURGICAL HISTORY       Past Surgical History:   Procedure Laterality Date    OTHER SURGICAL HISTORY  10/28/2019    Ankle fracture repair    TUBAL LIGATION  1986    WISDOM TOOTH EXTRACTION  1971         CURRENT MEDICATIONS       Previous Medications    ASPIRIN 81 MG EC TABLET    Take 1 tablet (81 mg) by mouth once daily.    ATORVASTATIN (LIPITOR) 20 MG TABLET    Take 1 tablet (20 mg) by mouth once daily.    CALCIUM CARBONATE 600 MG CALCIUM (1,500 MG) TABLET    Take 1 tablet (1,500 mg) by mouth 3 times a day.    EPINEPHRINE 0.3 MG/0.3 ML INJECTION SYRINGE    Inject 0.3 mL (0.3 mg) as directed if needed for anaphylaxis. As Directed    ERGOCALCIFEROL (VITAMIN D-2) 1.25 MG (45354 UT) CAPSULE    TAKE 1 CAPSULE (1,250 MCG) BY MOUTH 1 (ONE) TIME PER WEEK.    SEMAGLUTIDE 0.25 MG OR  0.5 MG (2 MG/3 ML) PEN INJECTOR    Inject 0.25 mg under the skin once a week.       ALLERGIES     Amoxicillin and Cefdinir    FAMILY HISTORY       Family History   Problem Relation Name Age of Onset    Other (malignant neoplasm) Mother Roberto Carlos Apple     Anesthesia related problems Mother Roberto Carlos Apple     Breast cancer Mother Roberto Carlos Apple 25    Other (malignant neoplasm of colon) Father Yaa Apple Jr.     Blood clot Father Yaa Apple, .     Cancer Father Yaa Apple, .     Colon cancer Father Yaa Apple Jr.     Asthma Brother Yaa Apple, .     COPD Brother Yaa Apple, .     Diabetes Brother Yaa Apple, .     Hearing loss Brother Yaa Apple, .     Heart disease Brother Yaa Apple Jr.     Hypertension Brother Yaa Apple Jr.     Vision loss Brother Yaa Apple Jr.     Depression Daughter Radha Nam           SOCIAL HISTORY       Social History     Socioeconomic History    Marital status:    Tobacco Use    Smoking status: Former     Current packs/day: 0.00     Average packs/day: 0.5 packs/day for 10.3 years (5.1 ttl pk-yrs)     Types: Cigarettes     Start date: 6/10/1975     Quit date: 1985     Years since quittin.5    Smokeless tobacco: Never   Substance and Sexual Activity    Alcohol use: Never    Drug use: Never    Sexual activity: Not Currently     Partners: Male     Birth control/protection: Abstinence, Condom Male, Rhythm, Female Sterilization, Other     Comment: Birth control pill  from 0430-4205?     Social Drivers of Health     Financial Resource Strain: Low Risk  (7/10/2024)    Received from [facility]    Overall Financial Resource Strain (CARDIA)     Difficulty of Paying Living Expenses: Not hard at all   Food Insecurity: No Food Insecurity (7/10/2024)    [facility]    Hunger Vital Sign     Worried About Running Out of Food in the Last Year: Never true     Ran Out of Food in the Last Year: Never true   Transportation Needs: No Transportation Needs  (7/10/2024)    Received from [facility]    PRAPARE - Transportation     Lack of Transportation (Medical): No     Lack of Transportation (Non-Medical): No   Physical Activity: Insufficiently Active (7/10/2024)    Received from [facility]    Exercise Vital Sign     Days of Exercise per Week: 3 days     Minutes of Exercise per Session: 30 min   Stress: No Stress Concern Present (7/10/2024)    Received from [facility]    [place] [employer] - Occupational Stress Questionnaire     Feeling of Stress : Not at all   Social Connections: Socially Integrated (7/10/2024)    Received from [facility]    Social Connection and Isolation Panel [NHANES]     Frequency of Communication with Friends and Family: More than three times a week     Frequency of Social Gatherings with Friends and Family: More than three times a week     Attends Anabaptist Services: 1 to 4 times per year     Active Member of Clubs or Organizations: Yes     Attends Club or Organization Meetings: 1 to 4 times per year     Marital Status: Living with partner   Intimate Partner Violence: Not At Risk (7/10/2024)    Received from [facility]    Humiliation, Afraid, Rape, and Kick questionnaire     Fear of Current or Ex-Partner: No     Emotionally Abused: No     Physically Abused: No     Sexually Abused: No   Housing Stability: Low Risk  (7/10/2024)    Received from [facility]    Housing Stability Vital Sign     Unable to Pay for Housing in the Last Year: No     Number of Times Moved in the Last Year: 0     Homeless in the Last Year: No       SCREENINGS                        PHYSICAL EXAM    (up to 7 for level 4, 8 or more for level 5)     ED Triage Vitals [03/31/25 0535]   Temperature Heart Rate Respirations BP   36.2 °C (97.2 °F) 95 16 123/73      Pulse Ox Temp Source Heart Rate Source Patient Position   96 % Temporal Monitor Sitting      BP Location FiO2 (%)     Right arm --       Physical Exam  Vitals and nursing note reviewed.    Constitutional:       Appearance: Normal appearance.   HENT:      Head: Normocephalic and atraumatic.      Nose:      Comments: Rhino Rocket in right nare     Mouth/Throat:      Comments: No blood in oropharynx  Eyes:      General: No scleral icterus.        Right eye: No discharge.         Left eye: No discharge.      Conjunctiva/sclera: Conjunctivae normal.   Cardiovascular:      Rate and Rhythm: Normal rate and regular rhythm.   Pulmonary:      Effort: Pulmonary effort is normal. No respiratory distress.   Abdominal:      General: There is no distension.   Musculoskeletal:      Cervical back: Normal range of motion.   Skin:     General: Skin is warm and dry.   Neurological:      Mental Status: She is alert. Mental status is at baseline.   Psychiatric:         Mood and Affect: Mood normal.         Behavior: Behavior normal.          DIAGNOSTIC RESULTS     LABS:  Labs Reviewed - No data to display    All other labs were within normal range or not returned as of this dictation.    Imaging  No orders to display        Procedures  Procedures     EMERGENCY DEPARTMENT COURSE/MDM:     Diagnoses as of 03/31/25 0706   Epistaxis        Medical Decision Making  74-year-old female comes emergency for complications after epistaxis treatment, was seen here earlier had a Rhino Rocket placed, was discharged, says she woke up in the middle of the night about an hour ago, coughed up a clot.  She says she has a sore throat currently.  On exam she is tolerating secretions, no distress, has no blood dripping down her oropharynx, after multiple assessments, was not bleeding had no blood dripping down her oropharynx, I called ENT Dr. Silva who stated the patient could be seen in the office this week he says to call his office this morning, patient was amenable this plan, discharge at this time.  She will follow-up with ENT, return for any new, concerning worsening symptoms        Patient and or family in agreement and understanding  of treatment plan.  All questions answered.      I reviewed the case with the attending ED physician. The attending ED physician agrees with the plan. Patient and/or patient´s representative was counseled regarding labs, imaging, likely diagnosis, and plan. All questions were answered.    ED Medications administered this visit:  Medications - No data to display    New Prescriptions from this visit:    New Prescriptions    No medications on file       Follow-up:  Braulio Wood MD  [id number]  [facility] [id number] [phone]          Braulio SIDHU MD  [address]   [facility] [id number] [phone]              Final Impression:   1. Epistaxis          (Please note that portions of this note were completed with a voice recognition program.  Efforts were made to edit the dictations but occasionally words are mis-transcribed.)     Rod Corbin DO  Resident  03/31/25 0706

## 2025-04-01 NOTE — ED PROCEDURE NOTE
Procedure  Epistaxis Management    Performed by: Leni Smyth MD  Authorized by: Leni Symth MD    Consent:     Consent obtained:  Verbal    Consent given by:  Patient  Universal protocol:     Patient identity confirmed:  Verbally with patient and provided demographic data  Anesthesia:     Anesthesia method:  Topical application    Topical anesthetic:  Lidocaine gel and epinephrine  Procedure details:     Treatment site:  R anterior    Treatment method:  Merocel sponge and anterior pack    Treatment complexity:  Limited    Treatment episode: recurring    Post-procedure details:     Assessment:  No improvement    Procedure completion:  Tolerated well, no immediate complications  Comments:      Patient had initial merocel sponge placed with afrin. Following had some improvement in bleeding but continued oozing. Following this attempted TXA, lidocaine with epinephrine and anterior packing with nasal clamp in place. On reassessment patient had continued oozing following so surgicel was placed, with subsequent afrin and merocel. Patient was signed out to my colleague pending reassessment a possible further intervention for bleeding.               Leni Smyth MD  04/01/25 [id number]

## 2025-10-27 ENCOUNTER — APPOINTMENT (OUTPATIENT)
Dept: PRIMARY CARE | Facility: CLINIC | Age: 75
End: 2025-10-27
Payer: COMMERCIAL